# Patient Record
Sex: FEMALE | Race: WHITE | NOT HISPANIC OR LATINO | Employment: UNEMPLOYED | URBAN - METROPOLITAN AREA
[De-identification: names, ages, dates, MRNs, and addresses within clinical notes are randomized per-mention and may not be internally consistent; named-entity substitution may affect disease eponyms.]

---

## 2019-09-11 ENCOUNTER — HISTORICAL (OUTPATIENT)
Dept: ADMINISTRATIVE | Facility: HOSPITAL | Age: 59
End: 2019-09-11

## 2019-10-09 ENCOUNTER — HISTORICAL (OUTPATIENT)
Dept: ADMINISTRATIVE | Facility: HOSPITAL | Age: 59
End: 2019-10-09

## 2022-04-29 VITALS
WEIGHT: 139.56 LBS | WEIGHT: 138.88 LBS | HEIGHT: 64 IN | HEIGHT: 64 IN | BODY MASS INDEX: 23.82 KG/M2 | BODY MASS INDEX: 23.71 KG/M2

## 2022-05-02 NOTE — HISTORICAL [facility]
This is a historical note converted from Steve. Formatting and pictures may have been removed.  Please reference Steve for original formatting and attached multimedia. Chief Complaint  Right big toe fracture  History of Present Illness  This is a 59-year-old female here for follow-up of a right great toe fracture.? This injury occurred approximately 6 weeks ago.? She has been ambulating in a cam boot and reports improved pain.  Review of Systems  Denies fevers or chills  Denies numbness or paresthesias  Physical Exam  Vitals & Measurements  HT:?162?cm? WT:?63?kg? BMI:?24.01?  NAD  Resp unlabored  CV RR  Right lower extremity  Skin intact  Sensation intact to light touch  No bony tenderness over the?medial?great toe at the fracture site  No pain with range of motion of the great toe  ehl/fhl/ta/gs intact  BCR  Assessment/Plan  Toe fracture, right?S92.911A  Orders:  Clinic Follow-up PRN, 10/09/19 8:19:00 CDT  This is a 59-year-old female?who is approximately 6 weeks out from a right?great toe fracture.? At this point, she is clinically healed and may transition out of the cam boot back into?normal shoes?and advance her activity as tolerated.? Follow-up when necessary.   Medications   mg-65 mg oral powder for reconstitution, 1 packet(s), Oral, q6hr, PRN  naproxen 500 mg oral delayed release tablet, 500 mg= 1 tab(s), Oral, BID  Zanaflex 4 mg oral tablet, 4 mg= 1 tab(s), Oral, q8hr,? ?Not taking  Diagnostic Results  X-ray 3 views of the right foot show a healing fracture at the base of the?right great toe.? No interval?displacement compared to prior x-rays.? There is callous formation at the fracture site.      Sasha Heath medical record was reviewed with?Dr. Calarbese.? HPI, PE and treatment plan was reviewed.? Treatment plan was reasonable and necessary.??Imaging was reviewed at the time of visit.??
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
## 2022-11-22 ENCOUNTER — OFFICE VISIT (OUTPATIENT)
Dept: FAMILY MEDICINE | Facility: CLINIC | Age: 62
End: 2022-11-22
Payer: MEDICAID

## 2022-11-22 VITALS
DIASTOLIC BLOOD PRESSURE: 84 MMHG | OXYGEN SATURATION: 99 % | WEIGHT: 120 LBS | RESPIRATION RATE: 20 BRPM | SYSTOLIC BLOOD PRESSURE: 139 MMHG | TEMPERATURE: 98 F | HEIGHT: 63 IN | BODY MASS INDEX: 21.26 KG/M2 | HEART RATE: 94 BPM

## 2022-11-22 DIAGNOSIS — L21.9 SEBORRHEIC DERMATITIS: ICD-10-CM

## 2022-11-22 DIAGNOSIS — L82.1 SEBORRHEIC KERATOSES: ICD-10-CM

## 2022-11-22 DIAGNOSIS — H93.11 RIGHT-SIDED TINNITUS: ICD-10-CM

## 2022-11-22 DIAGNOSIS — F41.8 ANXIETY WITH DEPRESSION: Primary | ICD-10-CM

## 2022-11-22 DIAGNOSIS — Z00.00 WELLNESS EXAMINATION: ICD-10-CM

## 2022-11-22 DIAGNOSIS — Z12.31 ENCOUNTER FOR SCREENING MAMMOGRAM FOR BREAST CANCER: ICD-10-CM

## 2022-11-22 DIAGNOSIS — F31.70 BIPOLAR AFFECTIVE DISORDER IN REMISSION: ICD-10-CM

## 2022-11-22 DIAGNOSIS — F51.01 PRIMARY INSOMNIA: ICD-10-CM

## 2022-11-22 DIAGNOSIS — Z23 ENCOUNTER FOR IMMUNIZATION: ICD-10-CM

## 2022-11-22 DIAGNOSIS — Z86.010 HISTORY OF COLON POLYPS: ICD-10-CM

## 2022-11-22 LAB
ALBUMIN SERPL-MCNC: 4.3 GM/DL (ref 3.4–4.8)
ALBUMIN/GLOB SERPL: 1.3 RATIO (ref 1.1–2)
ALP SERPL-CCNC: 81 UNIT/L (ref 40–150)
ALT SERPL-CCNC: 11 UNIT/L (ref 0–55)
APPEARANCE UR: CLEAR
AST SERPL-CCNC: 18 UNIT/L (ref 5–34)
BACTERIA #/AREA URNS AUTO: ABNORMAL /HPF
BASOPHILS # BLD AUTO: 0.07 X10(3)/MCL (ref 0–0.2)
BASOPHILS NFR BLD AUTO: 0.6 %
BILIRUB UR QL STRIP.AUTO: NEGATIVE MG/DL
BILIRUBIN DIRECT+TOT PNL SERPL-MCNC: 0.4 MG/DL
BUN SERPL-MCNC: 8 MG/DL (ref 9.8–20.1)
CALCIUM SERPL-MCNC: 9.5 MG/DL (ref 8.4–10.2)
CHLORIDE SERPL-SCNC: 102 MMOL/L (ref 98–107)
CHOLEST SERPL-MCNC: 223 MG/DL
CHOLEST/HDLC SERPL: 4 {RATIO} (ref 0–5)
CO2 SERPL-SCNC: 29 MMOL/L (ref 23–31)
COLOR UR AUTO: COLORLESS
CREAT SERPL-MCNC: 0.69 MG/DL (ref 0.55–1.02)
EOSINOPHIL # BLD AUTO: 0.01 X10(3)/MCL (ref 0–0.9)
EOSINOPHIL NFR BLD AUTO: 0.1 %
ERYTHROCYTE [DISTWIDTH] IN BLOOD BY AUTOMATED COUNT: 12.8 % (ref 11.5–17)
EST. AVERAGE GLUCOSE BLD GHB EST-MCNC: 111.2 MG/DL
GFR SERPLBLD CREATININE-BSD FMLA CKD-EPI: >60 MLS/MIN/1.73/M2
GLOBULIN SER-MCNC: 3.4 GM/DL (ref 2.4–3.5)
GLUCOSE SERPL-MCNC: 137 MG/DL (ref 82–115)
GLUCOSE UR QL STRIP.AUTO: NORMAL MG/DL
HAV IGM SERPL QL IA: NONREACTIVE
HBA1C MFR BLD: 5.5 %
HBV CORE IGM SERPL QL IA: NONREACTIVE
HBV SURFACE AG SERPL QL IA: NONREACTIVE
HCT VFR BLD AUTO: 42.5 % (ref 37–47)
HCV AB SERPL QL IA: NONREACTIVE
HDLC SERPL-MCNC: 53 MG/DL (ref 35–60)
HGB BLD-MCNC: 14.3 GM/DL (ref 12–16)
HIV 1+2 AB+HIV1 P24 AG SERPL QL IA: NONREACTIVE
HYALINE CASTS #/AREA URNS LPF: ABNORMAL /LPF
IMM GRANULOCYTES # BLD AUTO: 0.04 X10(3)/MCL (ref 0–0.04)
IMM GRANULOCYTES NFR BLD AUTO: 0.3 %
KETONES UR QL STRIP.AUTO: NEGATIVE MG/DL
LDLC SERPL CALC-MCNC: 156 MG/DL (ref 50–140)
LEUKOCYTE ESTERASE UR QL STRIP.AUTO: NEGATIVE UNIT/L
LYMPHOCYTES # BLD AUTO: 1.32 X10(3)/MCL (ref 0.6–4.6)
LYMPHOCYTES NFR BLD AUTO: 10.9 %
MCH RBC QN AUTO: 31.6 PG (ref 27–31)
MCHC RBC AUTO-ENTMCNC: 33.6 MG/DL (ref 33–36)
MCV RBC AUTO: 94 FL (ref 80–94)
MONOCYTES # BLD AUTO: 0.64 X10(3)/MCL (ref 0.1–1.3)
MONOCYTES NFR BLD AUTO: 5.3 %
NEUTROPHILS # BLD AUTO: 10.1 X10(3)/MCL (ref 2.1–9.2)
NEUTROPHILS NFR BLD AUTO: 82.8 %
NITRITE UR QL STRIP.AUTO: NEGATIVE
NRBC BLD AUTO-RTO: 0 %
PH UR STRIP.AUTO: 6 [PH]
PLATELET # BLD AUTO: 316 X10(3)/MCL (ref 130–400)
PMV BLD AUTO: 9.8 FL (ref 7.4–10.4)
POTASSIUM SERPL-SCNC: 4.1 MMOL/L (ref 3.5–5.1)
PROT SERPL-MCNC: 7.7 GM/DL (ref 5.8–7.6)
PROT UR QL STRIP.AUTO: NEGATIVE MG/DL
RBC # BLD AUTO: 4.52 X10(6)/MCL (ref 4.2–5.4)
RBC #/AREA URNS AUTO: ABNORMAL /HPF
RBC UR QL AUTO: NEGATIVE UNIT/L
SODIUM SERPL-SCNC: 140 MMOL/L (ref 136–145)
SP GR UR STRIP.AUTO: 1
SQUAMOUS #/AREA URNS LPF: ABNORMAL /HPF
T4 FREE SERPL-MCNC: 0.99 NG/DL (ref 0.7–1.48)
TRIGL SERPL-MCNC: 68 MG/DL (ref 37–140)
TSH SERPL-ACNC: 0.88 UIU/ML (ref 0.35–4.94)
UROBILINOGEN UR STRIP-ACNC: NORMAL MG/DL
VLDLC SERPL CALC-MCNC: 14 MG/DL
WBC # SPEC AUTO: 12.1 X10(3)/MCL (ref 4.5–11.5)
WBC #/AREA URNS AUTO: ABNORMAL /HPF

## 2022-11-22 PROCEDURE — 84443 ASSAY THYROID STIM HORMONE: CPT | Performed by: STUDENT IN AN ORGANIZED HEALTH CARE EDUCATION/TRAINING PROGRAM

## 2022-11-22 PROCEDURE — 81001 URINALYSIS AUTO W/SCOPE: CPT | Performed by: STUDENT IN AN ORGANIZED HEALTH CARE EDUCATION/TRAINING PROGRAM

## 2022-11-22 PROCEDURE — 3008F BODY MASS INDEX DOCD: CPT | Mod: CPTII,,, | Performed by: STUDENT IN AN ORGANIZED HEALTH CARE EDUCATION/TRAINING PROGRAM

## 2022-11-22 PROCEDURE — 80053 COMPREHEN METABOLIC PANEL: CPT | Performed by: STUDENT IN AN ORGANIZED HEALTH CARE EDUCATION/TRAINING PROGRAM

## 2022-11-22 PROCEDURE — 99214 PR OFFICE/OUTPT VISIT, EST, LEVL IV, 30-39 MIN: ICD-10-PCS | Mod: 25,S$PBB,, | Performed by: STUDENT IN AN ORGANIZED HEALTH CARE EDUCATION/TRAINING PROGRAM

## 2022-11-22 PROCEDURE — 84439 ASSAY OF FREE THYROXINE: CPT | Performed by: STUDENT IN AN ORGANIZED HEALTH CARE EDUCATION/TRAINING PROGRAM

## 2022-11-22 PROCEDURE — 1159F PR MEDICATION LIST DOCUMENTED IN MEDICAL RECORD: ICD-10-PCS | Mod: CPTII,,, | Performed by: STUDENT IN AN ORGANIZED HEALTH CARE EDUCATION/TRAINING PROGRAM

## 2022-11-22 PROCEDURE — 3008F PR BODY MASS INDEX (BMI) DOCUMENTED: ICD-10-PCS | Mod: CPTII,,, | Performed by: STUDENT IN AN ORGANIZED HEALTH CARE EDUCATION/TRAINING PROGRAM

## 2022-11-22 PROCEDURE — 80074 ACUTE HEPATITIS PANEL: CPT | Performed by: STUDENT IN AN ORGANIZED HEALTH CARE EDUCATION/TRAINING PROGRAM

## 2022-11-22 PROCEDURE — 80061 LIPID PANEL: CPT | Performed by: STUDENT IN AN ORGANIZED HEALTH CARE EDUCATION/TRAINING PROGRAM

## 2022-11-22 PROCEDURE — 99205 OFFICE O/P NEW HI 60 MIN: CPT | Mod: PBBFAC,PN | Performed by: STUDENT IN AN ORGANIZED HEALTH CARE EDUCATION/TRAINING PROGRAM

## 2022-11-22 PROCEDURE — 90686 IIV4 VACC NO PRSV 0.5 ML IM: CPT | Mod: PBBFAC,PN

## 2022-11-22 PROCEDURE — 1159F MED LIST DOCD IN RCRD: CPT | Mod: CPTII,,, | Performed by: STUDENT IN AN ORGANIZED HEALTH CARE EDUCATION/TRAINING PROGRAM

## 2022-11-22 PROCEDURE — 87389 HIV-1 AG W/HIV-1&-2 AB AG IA: CPT | Performed by: STUDENT IN AN ORGANIZED HEALTH CARE EDUCATION/TRAINING PROGRAM

## 2022-11-22 PROCEDURE — 36415 COLL VENOUS BLD VENIPUNCTURE: CPT | Performed by: STUDENT IN AN ORGANIZED HEALTH CARE EDUCATION/TRAINING PROGRAM

## 2022-11-22 PROCEDURE — 3075F SYST BP GE 130 - 139MM HG: CPT | Mod: CPTII,,, | Performed by: STUDENT IN AN ORGANIZED HEALTH CARE EDUCATION/TRAINING PROGRAM

## 2022-11-22 PROCEDURE — 3079F PR MOST RECENT DIASTOLIC BLOOD PRESSURE 80-89 MM HG: ICD-10-PCS | Mod: CPTII,,, | Performed by: STUDENT IN AN ORGANIZED HEALTH CARE EDUCATION/TRAINING PROGRAM

## 2022-11-22 PROCEDURE — 3075F PR MOST RECENT SYSTOLIC BLOOD PRESS GE 130-139MM HG: ICD-10-PCS | Mod: CPTII,,, | Performed by: STUDENT IN AN ORGANIZED HEALTH CARE EDUCATION/TRAINING PROGRAM

## 2022-11-22 PROCEDURE — 83036 HEMOGLOBIN GLYCOSYLATED A1C: CPT | Performed by: STUDENT IN AN ORGANIZED HEALTH CARE EDUCATION/TRAINING PROGRAM

## 2022-11-22 PROCEDURE — 99214 OFFICE O/P EST MOD 30 MIN: CPT | Mod: 25,S$PBB,, | Performed by: STUDENT IN AN ORGANIZED HEALTH CARE EDUCATION/TRAINING PROGRAM

## 2022-11-22 PROCEDURE — 99386 PR PREVENTIVE VISIT,NEW,40-64: ICD-10-PCS | Mod: S$PBB,,, | Performed by: STUDENT IN AN ORGANIZED HEALTH CARE EDUCATION/TRAINING PROGRAM

## 2022-11-22 PROCEDURE — 85025 COMPLETE CBC W/AUTO DIFF WBC: CPT | Performed by: STUDENT IN AN ORGANIZED HEALTH CARE EDUCATION/TRAINING PROGRAM

## 2022-11-22 PROCEDURE — 99386 PREV VISIT NEW AGE 40-64: CPT | Mod: S$PBB,,, | Performed by: STUDENT IN AN ORGANIZED HEALTH CARE EDUCATION/TRAINING PROGRAM

## 2022-11-22 PROCEDURE — 3079F DIAST BP 80-89 MM HG: CPT | Mod: CPTII,,, | Performed by: STUDENT IN AN ORGANIZED HEALTH CARE EDUCATION/TRAINING PROGRAM

## 2022-11-22 RX ORDER — KETOCONAZOLE 20 MG/ML
SHAMPOO, SUSPENSION TOPICAL
Qty: 120 ML | Refills: 3 | Status: SHIPPED | OUTPATIENT
Start: 2022-11-24 | End: 2022-12-15 | Stop reason: SDUPTHER

## 2022-11-22 RX ORDER — QUETIAPINE FUMARATE 25 MG/1
25 TABLET, FILM COATED ORAL NIGHTLY
Qty: 30 TABLET | Refills: 1 | Status: SHIPPED | OUTPATIENT
Start: 2022-11-22 | End: 2022-12-15 | Stop reason: SDUPTHER

## 2022-11-22 NOTE — PROGRESS NOTES
Patient Name: Sasha Carrillo   : 1960  MRN: [id number]     Subjective:   Patient ID: Sasha Carrillo is a 62 y.o. female.    Chief Complaint:   Chief Complaint   Patient presents with    Establish Care        HPI: HPI  62-year-old female presents to clinic to establish care  States she has not had a doctor in many  Years    Bipolar disorder  States she was diagnosed approximately 12 years ago  Does describe some manic type behavior in the beginning when she was diagnosed has been tried on Trileptal and Abilify; states that this did not help her and made her feel terrible  Has not been on medication for 12 years  Denies excess shopping, gambling but does complain of difficulty sleeping    Insomnia  Reports history of insomnia for which she has tried melatonin and Saint Russ's Wort    No SI or HI reports that neither medication has helped    Right-sided tinnitus with balance issues  Reports history of right-sided tinnitus with pulsatile heartbeat  States that she will often feel off balance especially when she is getting up too quickly  Denies syncope or sensation of the room spinning      History of colon polyps  Reports having a history of been told that she has colon polyps and bright red bleeding per rectum  From hemorrhoids does not remember the year that she had colonoscopy completed      Elevated blood pressure  139/84  Initially reported that she had headaches but then denies history of headaches      Scaly skin  over face and scalp  States she picks the skin away but notices that the redness and dry skin come back     Patient reports being a former cigarette smoker  Surgical history-BTL  Family history-mother passed away from adenocarcinoma, father passed away from COPD      Health maintenance  Shingles vaccine-2022 will need repeat in  2-6 months  Influenza vaccine-2022  Pap smear-declines referral to Gynecology semi: Will replete Pap smear at next visit  ROS:  Review of Systems  "  Constitutional:  Negative for chills and fever.   HENT:  Negative for sore throat.    Respiratory:  Negative for shortness of breath and wheezing.    Cardiovascular:  Negative for chest pain, palpitations and leg swelling.   Gastrointestinal:  Negative for constipation, diarrhea and heartburn.   Genitourinary:  Negative for frequency and urgency.   Musculoskeletal:  Negative for back pain and myalgias.   Skin:  Positive for rash. Negative for itching.   Neurological:  Negative for dizziness, focal weakness and headaches.   Psychiatric/Behavioral:  The patient is not nervous/anxious.     History:     Past Medical History:   Diagnosis Date    Bipolar disorder       History reviewed. No pertinent surgical history.  Family History   Problem Relation Age of Onset    Cancer Mother     COPD Father       Social History     Tobacco Use    Smoking status: Former     Types: Cigarettes     Passive exposure: Never    Smokeless tobacco: Never    Tobacco comments:     Quit 6 month ago   Substance and Sexual Activity    Alcohol use: Not Currently     Alcohol/week: 6.0 standard drinks     Types: 6 Cans of beer per week    Drug use: Never    Sexual activity: Not Currently     Birth control/protection: Abstinence        Allergies: Review of patient's allergies indicates:  No Known Allergies  Objective:     Vitals:    11/22/22 1322   BP: 139/84   Pulse: 94   Resp: 20   Temp: 98 °F (36.7 °C)   SpO2: 99%   Weight: 54.4 kg (120 lb)   Height: 5' 3" (1.6 m)   PainSc: 0-No pain     Body mass index is 21.26 kg/m².     Physical Examination:   Physical Exam  Constitutional:       General: She is not in acute distress.  Eyes:      General: No scleral icterus.     Extraocular Movements: Extraocular movements intact.      Conjunctiva/sclera: Conjunctivae normal.      Pupils: Pupils are equal, round, and reactive to light.   Cardiovascular:      Rate and Rhythm: Normal rate and regular rhythm.      Heart sounds: No murmur heard.  Pulmonary:      " Effort: Pulmonary effort is normal. No respiratory distress.      Breath sounds: No stridor. No wheezing or rhonchi.   Abdominal:      General: Bowel sounds are normal. There is no distension.      Palpations: Abdomen is soft.      Tenderness: There is no abdominal tenderness. There is no guarding.   Musculoskeletal:         General: No swelling. Normal range of motion.   Skin:     General: Skin is warm and dry.      Coloration: Skin is not jaundiced.      Findings: Rash present.      Comments: Erythematous and scaly rash noted over nasolabial folds on forehead and hairline consistent with seborrheic dermatitis    Right thigh with 1 cm raised hyperpigmented lesion consistent with seborrheic keratoses   Neurological:      Mental Status: She is alert.      Gait: Gait normal.   Psychiatric:         Thought Content: Thought content normal.       Assessment:     1. Anxiety with depression    2. Wellness examination    3. Primary insomnia    4. Encounter for screening mammogram for breast cancer    5. Encounter for immunization    6. History of colon polyps        Plan:     Problem List Items Addressed This Visit          Psychiatric    Bipolar affective disorder in remission    Overview     Sending to Behavioral Health for re diagnoses         Relevant Orders    Ambulatory referral/consult to Behavioral Health       Derm    Seborrheic keratoses    Overview     Declines removal at this time          Seborrheic dermatitis    Overview     Ketoconazole 2%            Other    Primary insomnia    Overview     Seroquel 25 mg q.h.s. as do not want to precipitate manic episode with patient         Relevant Medications    QUEtiapine (SEROQUEL) 25 MG Tab    Other Relevant Orders    T4, Free    TSH     Other Visit Diagnoses       Anxiety with depression    -  Primary    Relevant Medications    QUEtiapine (SEROQUEL) 25 MG Tab    Other Relevant Orders    T4, Free    TSH    Wellness examination        Relevant Orders    CBC Auto  Differential    Comprehensive Metabolic Panel    Lipid Panel    T4, Free    TSH    Urinalysis    Hemoglobin A1C    Hepatitis Panel, Acute    HIV 1/2 Ag/Ab (4th Gen)    Encounter for screening mammogram for breast cancer        Relevant Orders    Mammo Digital Screening Bilat    Encounter for immunization        Relevant Medications    varicella-zoster gE-AS01B (PF)(SHINGRIX) 50 mcg/0.5 mL injection (Completed)    Other Relevant Orders    Influenza - Quadrivalent *Preferred* (6 months+) (PF) (Completed)    History of colon polyps        Relevant Orders    Ambulatory referral/consult to Gastroenterology    Right-sided tinnitus        Relevant Orders    Ambulatory referral/consult to Audiology           Problem List Items Addressed This Visit    None  Visit Diagnoses       Anxiety with depression    -  Primary    Relevant Medications    QUEtiapine (SEROQUEL) 25 MG Tab    Other Relevant Orders    T4, Free    TSH    Wellness examination        Relevant Orders    CBC Auto Differential    Comprehensive Metabolic Panel    Lipid Panel    T4, Free    TSH    Urinalysis    Hemoglobin A1C    Hepatitis Panel, Acute    HIV 1/2 Ag/Ab (4th Gen)    Primary insomnia        Relevant Medications    QUEtiapine (SEROQUEL) 25 MG Tab    Other Relevant Orders    T4, Free    TSH    Encounter for screening mammogram for breast cancer        Relevant Orders    Mammo Digital Screening Bilat    Encounter for immunization        Relevant Medications    varicella-zoster gE-AS01B (PF)(SHINGRIX) 50 mcg/0.5 mL injection (Completed)    Other Relevant Orders    Influenza - Quadrivalent *Preferred* (6 months+) (PF) (Completed)    History of colon polyps        Relevant Orders    Ambulatory referral/consult to Gastroenterology             Follow up in about 2 weeks (around 12/6/2022) for BP check, lab results, pap smear.

## 2022-11-23 ENCOUNTER — TELEPHONE (OUTPATIENT)
Dept: AUDIOLOGY | Facility: HOSPITAL | Age: 62
End: 2022-11-23
Payer: MEDICAID

## 2022-11-23 NOTE — TELEPHONE ENCOUNTER
Attempted to schedule hearing evaluation, but patient has deferred appointment at this time. Patient would like to discuss blood pressure concerns with PCP before proceeding with hearing test. Patient will call to schedule hearing evaluation if she decides she would like this done.

## 2022-11-25 LAB — PATH REV: NORMAL

## 2022-12-13 ENCOUNTER — HOSPITAL ENCOUNTER (OUTPATIENT)
Dept: RADIOLOGY | Facility: HOSPITAL | Age: 62
Discharge: HOME | End: 2022-12-13
Attending: STUDENT IN AN ORGANIZED HEALTH CARE EDUCATION/TRAINING PROGRAM
Payer: MEDICAID

## 2022-12-13 DIAGNOSIS — Z12.31 ENCOUNTER FOR SCREENING MAMMOGRAM FOR BREAST CANCER: ICD-10-CM

## 2022-12-13 PROCEDURE — 77063 BREAST TOMOSYNTHESIS BI: CPT | Mod: TC

## 2022-12-13 PROCEDURE — 77067 MAMMO DIGITAL SCREENING BILAT WITH TOMO: ICD-10-PCS | Mod: 26,,, | Performed by: RADIOLOGY

## 2022-12-13 PROCEDURE — 77067 SCR MAMMO BI INCL CAD: CPT | Mod: TC

## 2022-12-13 PROCEDURE — 77067 SCR MAMMO BI INCL CAD: CPT | Mod: 26,,, | Performed by: RADIOLOGY

## 2022-12-13 PROCEDURE — 77063 BREAST TOMOSYNTHESIS BI: CPT | Mod: 26,,, | Performed by: RADIOLOGY

## 2022-12-13 PROCEDURE — 77063 MAMMO DIGITAL SCREENING BILAT WITH TOMO: ICD-10-PCS | Mod: 26,,, | Performed by: RADIOLOGY

## 2022-12-15 ENCOUNTER — OFFICE VISIT (OUTPATIENT)
Dept: FAMILY MEDICINE | Facility: CLINIC | Age: 62
End: 2022-12-15
Payer: MEDICAID

## 2022-12-15 ENCOUNTER — TELEPHONE (OUTPATIENT)
Dept: FAMILY MEDICINE | Facility: CLINIC | Age: 62
End: 2022-12-15

## 2022-12-15 VITALS
RESPIRATION RATE: 20 BRPM | WEIGHT: 120 LBS | HEIGHT: 63 IN | OXYGEN SATURATION: 99 % | TEMPERATURE: 99 F | BODY MASS INDEX: 21.26 KG/M2 | HEART RATE: 79 BPM | SYSTOLIC BLOOD PRESSURE: 124 MMHG | DIASTOLIC BLOOD PRESSURE: 81 MMHG

## 2022-12-15 DIAGNOSIS — F51.01 PRIMARY INSOMNIA: ICD-10-CM

## 2022-12-15 DIAGNOSIS — Z01.419 WELL WOMAN EXAM: ICD-10-CM

## 2022-12-15 DIAGNOSIS — E78.5 HYPERLIPIDEMIA, UNSPECIFIED HYPERLIPIDEMIA TYPE: ICD-10-CM

## 2022-12-15 DIAGNOSIS — F41.8 ANXIETY WITH DEPRESSION: ICD-10-CM

## 2022-12-15 DIAGNOSIS — L21.9 SEBORRHEIC DERMATITIS: Primary | ICD-10-CM

## 2022-12-15 DIAGNOSIS — E78.49 OTHER HYPERLIPIDEMIA: ICD-10-CM

## 2022-12-15 PROCEDURE — 99213 OFFICE O/P EST LOW 20 MIN: CPT | Mod: PBBFAC,PN | Performed by: STUDENT IN AN ORGANIZED HEALTH CARE EDUCATION/TRAINING PROGRAM

## 2022-12-15 PROCEDURE — 3008F BODY MASS INDEX DOCD: CPT | Mod: CPTII,,, | Performed by: STUDENT IN AN ORGANIZED HEALTH CARE EDUCATION/TRAINING PROGRAM

## 2022-12-15 PROCEDURE — 3008F PR BODY MASS INDEX (BMI) DOCUMENTED: ICD-10-PCS | Mod: CPTII,,, | Performed by: STUDENT IN AN ORGANIZED HEALTH CARE EDUCATION/TRAINING PROGRAM

## 2022-12-15 PROCEDURE — 3074F PR MOST RECENT SYSTOLIC BLOOD PRESSURE < 130 MM HG: ICD-10-PCS | Mod: CPTII,,, | Performed by: STUDENT IN AN ORGANIZED HEALTH CARE EDUCATION/TRAINING PROGRAM

## 2022-12-15 PROCEDURE — 99214 OFFICE O/P EST MOD 30 MIN: CPT | Mod: S$PBB,,, | Performed by: STUDENT IN AN ORGANIZED HEALTH CARE EDUCATION/TRAINING PROGRAM

## 2022-12-15 PROCEDURE — 3074F SYST BP LT 130 MM HG: CPT | Mod: CPTII,,, | Performed by: STUDENT IN AN ORGANIZED HEALTH CARE EDUCATION/TRAINING PROGRAM

## 2022-12-15 PROCEDURE — 99214 PR OFFICE/OUTPT VISIT, EST, LEVL IV, 30-39 MIN: ICD-10-PCS | Mod: S$PBB,,, | Performed by: STUDENT IN AN ORGANIZED HEALTH CARE EDUCATION/TRAINING PROGRAM

## 2022-12-15 PROCEDURE — 3079F PR MOST RECENT DIASTOLIC BLOOD PRESSURE 80-89 MM HG: ICD-10-PCS | Mod: CPTII,,, | Performed by: STUDENT IN AN ORGANIZED HEALTH CARE EDUCATION/TRAINING PROGRAM

## 2022-12-15 PROCEDURE — 3079F DIAST BP 80-89 MM HG: CPT | Mod: CPTII,,, | Performed by: STUDENT IN AN ORGANIZED HEALTH CARE EDUCATION/TRAINING PROGRAM

## 2022-12-15 RX ORDER — QUETIAPINE FUMARATE 25 MG/1
25 TABLET, FILM COATED ORAL NIGHTLY
Qty: 30 TABLET | Refills: 1 | Status: SHIPPED | OUTPATIENT
Start: 2022-12-15 | End: 2023-01-30 | Stop reason: SDUPTHER

## 2022-12-15 RX ORDER — ROSUVASTATIN CALCIUM 5 MG/1
5 TABLET, COATED ORAL DAILY
Qty: 90 TABLET | Refills: 3 | Status: SHIPPED | OUTPATIENT
Start: 2022-12-15 | End: 2023-12-06 | Stop reason: SDUPTHER

## 2022-12-15 RX ORDER — KETOCONAZOLE 20 MG/ML
SHAMPOO, SUSPENSION TOPICAL
Qty: 120 ML | Refills: 11 | Status: SHIPPED | OUTPATIENT
Start: 2022-12-15

## 2022-12-15 NOTE — PROGRESS NOTES
Patient Name: Sasha Carrillo   : 1960  MRN: [id number]     Subjective:   Patient ID: Sasha Carrillo is a 62 y.o. female.    Chief Complaint:   Chief Complaint   Patient presents with    Follow-up        HPI: HPI  62-year-old female presents to clinic for follow up   States she has not had a doctor in many  Years    Bipolar disorder  States she was diagnosed approximately 12 years ago  Does describe some manic type behavior in the beginning when she was diagnosed has been tried on Trileptal and Abilify; states that this did not help her and made her feel terrible  Has not been on medication for 12 years  Denies excess shopping, gambling but does complain of difficulty sleeping  Referred to  per patient request previous appointment. Has not yet had appointment    Insomnia  Reports history of insomnia for which she has tried melatonin and Saint Russ's Wort  Asked for seroquel as she states she has been on the medication in the past  Was provided previous appointment but did not receive    No SI or HI reports that neither medication has helped    Right-sided tinnitus with balance issues  Reports history of right-sided tinnitus with pulsatile heartbeat  States that she will often feel off balance especially when she is getting up too quickly  Denies syncope or sensation of the room spinning  Was referred to audiology but did not attend and said problem has ceased       History of colon polyps  Reports having a history of been told that she has colon polyps and bright red bleeding per rectum  From hemorrhoids does not remember the year that she had colonoscopy completed        SD  States she picks the skin away but notices that the redness and dry skin come back   Ketoconazole shampoo has relieved scaliness       HLD  Noted on labs      Patient reports being a former cigarette smoker  Surgical history-BTL  Family history-mother passed away from adenocarcinoma, father passed away from COPD      Health  "maintenance  Shingles vaccine-11/22/2022 will need repeat in  2-6 months  Influenza vaccine-11/22/2022  Pap smear-declines referral to Gynecology semi: ROS:  Review of Systems   Constitutional:  Negative for chills and fever.   HENT:  Negative for sore throat.    Respiratory:  Negative for shortness of breath and wheezing.    Cardiovascular:  Negative for chest pain, palpitations and leg swelling.   Gastrointestinal:  Negative for constipation, diarrhea and heartburn.   Genitourinary:  Negative for frequency and urgency.   Musculoskeletal:  Negative for back pain and myalgias.   Skin:  Positive for rash. Negative for itching.   Neurological:  Negative for dizziness, focal weakness and headaches.   Psychiatric/Behavioral:  The patient is not nervous/anxious.     History:     Past Medical History:   Diagnosis Date    Bipolar disorder       History reviewed. No pertinent surgical history.  Family History   Problem Relation Age of Onset    Cancer Mother     COPD Father       Social History     Tobacco Use    Smoking status: Former     Types: Cigarettes     Passive exposure: Never    Smokeless tobacco: Never    Tobacco comments:     Quit 6 month ago   Substance and Sexual Activity    Alcohol use: Not Currently     Alcohol/week: 6.0 standard drinks     Types: 6 Cans of beer per week    Drug use: Never    Sexual activity: Not Currently     Birth control/protection: Abstinence        Allergies: Review of patient's allergies indicates:  No Known Allergies  Objective:     Vitals:    12/15/22 1001   BP: 124/81   Pulse: 79   Resp: 20   Temp: 98.7 °F (37.1 °C)   SpO2: 99%   Weight: 54.4 kg (120 lb)   Height: 5' 3" (1.6 m)   PainSc: 0-No pain     Body mass index is 21.26 kg/m².     Physical Examination:   Physical Exam  Constitutional:       General: She is not in acute distress.  Eyes:      General: No scleral icterus.     Extraocular Movements: Extraocular movements intact.      Conjunctiva/sclera: Conjunctivae normal.      " Pupils: Pupils are equal, round, and reactive to light.   Cardiovascular:      Rate and Rhythm: Normal rate and regular rhythm.      Heart sounds: No murmur heard.  Pulmonary:      Effort: Pulmonary effort is normal. No respiratory distress.      Breath sounds: No stridor. No wheezing or rhonchi.   Abdominal:      General: Bowel sounds are normal. There is no distension.      Palpations: Abdomen is soft.      Tenderness: There is no abdominal tenderness. There is no guarding.   Musculoskeletal:         General: No swelling. Normal range of motion.   Skin:     General: Skin is warm and dry.      Coloration: Skin is not jaundiced.      Findings: Rash present.      Comments: Erythematous and scaly rash noted over nasolabial folds on forehead and hairline consistent with seborrheic dermatitis    Right thigh with 1 cm raised hyperpigmented lesion consistent with seborrheic keratoses   Neurological:      Mental Status: She is alert.      Gait: Gait normal.   Psychiatric:         Thought Content: Thought content normal.       Assessment:     1. Seborrheic dermatitis    2. Anxiety with depression    3. Primary insomnia    4. Hyperlipidemia, unspecified hyperlipidemia type    5. Well woman exam    6. Other hyperlipidemia        Plan:     Problem List Items Addressed This Visit          Derm    Seborrheic dermatitis - Primary    Overview     Ketoconazole 2% refills given         Relevant Medications    ketoconazole (NIZORAL) 2 % shampoo       Cardiac/Vascular    Other hyperlipidemia    Overview     Shared decision making. Would like to start Crestor  5 mg started with medication AE discussion             Other    Primary insomnia    Overview     Seroquel 25 mg q.h.s. as do not want to precipitate manic episode with patient  Referred to          Relevant Medications    QUEtiapine (SEROQUEL) 25 MG Tab     Other Visit Diagnoses       Anxiety with depression        Relevant Medications    QUEtiapine (SEROQUEL) 25 MG Tab     Hyperlipidemia, unspecified hyperlipidemia type        Relevant Medications    rosuvastatin (CRESTOR) 5 MG tablet    Well woman exam        Relevant Orders    Ambulatory referral/consult to Gynecology           Problem List Items Addressed This Visit          Derm    Seborrheic dermatitis - Primary    Overview     Ketoconazole 2% refills given         Relevant Medications    ketoconazole (NIZORAL) 2 % shampoo       Cardiac/Vascular    Other hyperlipidemia    Overview     Shared decision making. Would like to start Crestor  5 mg started with medication AE discussion             Other    Primary insomnia    Overview     Seroquel 25 mg q.h.s. as do not want to precipitate manic episode with patient  Referred to          Relevant Medications    QUEtiapine (SEROQUEL) 25 MG Tab     Other Visit Diagnoses       Anxiety with depression        Relevant Medications    QUEtiapine (SEROQUEL) 25 MG Tab    Hyperlipidemia, unspecified hyperlipidemia type        Relevant Medications    rosuvastatin (CRESTOR) 5 MG tablet    Well woman exam        Relevant Orders    Ambulatory referral/consult to Gynecology             Follow up in about 2 weeks (around 12/29/2022) for Virtual Visit, Insomnia.

## 2023-01-30 ENCOUNTER — OFFICE VISIT (OUTPATIENT)
Dept: FAMILY MEDICINE | Facility: CLINIC | Age: 63
End: 2023-01-30
Payer: MEDICAID

## 2023-01-30 DIAGNOSIS — F41.8 ANXIETY WITH DEPRESSION: ICD-10-CM

## 2023-01-30 DIAGNOSIS — F51.01 PRIMARY INSOMNIA: ICD-10-CM

## 2023-01-30 DIAGNOSIS — Z12.11 COLON CANCER SCREENING: Primary | ICD-10-CM

## 2023-01-30 PROCEDURE — 99213 PR OFFICE/OUTPT VISIT, EST, LEVL III, 20-29 MIN: ICD-10-PCS | Mod: 95,,, | Performed by: STUDENT IN AN ORGANIZED HEALTH CARE EDUCATION/TRAINING PROGRAM

## 2023-01-30 PROCEDURE — 99213 OFFICE O/P EST LOW 20 MIN: CPT | Mod: 95,,, | Performed by: STUDENT IN AN ORGANIZED HEALTH CARE EDUCATION/TRAINING PROGRAM

## 2023-01-30 RX ORDER — QUETIAPINE FUMARATE 25 MG/1
25 TABLET, FILM COATED ORAL NIGHTLY
Qty: 30 TABLET | Refills: 6 | Status: SHIPPED | OUTPATIENT
Start: 2023-01-30 | End: 2023-02-15 | Stop reason: DRUGHIGH

## 2023-01-30 NOTE — PROGRESS NOTES
Audio Only Telehealth Visit     The patient location is: home  The chief complaint leading to consultation is: lipid results   Visit type: Virtual visit with audio only (telephone)  Total time spent with patient: 15 minutes      The reason for the audio only service rather than synchronous audio and video virtual visit was related to technical difficulties or patient preference/necessity.     Each patient to whom I provide medical services by telemedicine is:  (1) informed of the relationship between the physician and patient and the respective role of any other health care provider with respect to management of the patient; and (2) notified that they may decline to receive medical services by telemedicine and may withdraw from such care at any time. Patient verbally consented to receive this service via voice-only telephone call.       HPI:     62-year-old female presents for follow up   States she has not had a doctor in many  Years     Bipolar disorder  States she was diagnosed approximately 12 years ago  Does describe some manic type behavior in the beginning when she was diagnosed has been tried on Trileptal and Abilify; states that this did not help her and made her feel terrible  Has not been on medication for 12 years  Denies excess shopping, gambling but does complain of difficulty sleeping  Referred to  per patient request and has upcoming appointment 2/15/2023     Insomnia  Reports history of insomnia for which she has tried melatonin and Saint Russ's Wort  Asked for seroquel as she states she has been on the medication in the past  States she is sleeping better    No SI or HI      Right-sided tinnitus with balance issues  Reports history of right-sided tinnitus with pulsatile heartbeat  States that she will often feel off balance especially when she is getting up too quickly  Denies syncope or sensation of the room spinning  Was referred to audiology but did not attend and said problem has ceased                 SD  States she picks the skin away but notices that the redness and dry skin come back   Ketoconazole shampoo has relieved scaliness         HLD  Noted on labs  Crestor 5 mg started with good results         Patient reports being a former cigarette smoker  Surgical history-BTL  Family history-mother passed away from adenocarcinoma, father passed away from COPD        Health maintenance  Shingles vaccine-11/22/2022 will need repeat in  2-6 months  Influenza vaccine-11/22/2022  Pap smear-declines referral to Gynecology semi:      Assessment and plan:             Follow up in about 1 month (around 2/28/2023) for Pap smear .              This service was not originating from a related E/M service provided within the previous 7 days nor will  to an E/M service or procedure within the next 24 hours or my soonest available appointment.  Prevailing standard of care was able to be met in this audio-only visit.

## 2023-02-13 ENCOUNTER — PATIENT MESSAGE (OUTPATIENT)
Dept: ADMINISTRATIVE | Facility: HOSPITAL | Age: 63
End: 2023-02-13
Payer: MEDICAID

## 2023-02-15 ENCOUNTER — OFFICE VISIT (OUTPATIENT)
Dept: BEHAVIORAL HEALTH | Facility: CLINIC | Age: 63
End: 2023-02-15
Payer: MEDICAID

## 2023-02-15 VITALS
DIASTOLIC BLOOD PRESSURE: 76 MMHG | BODY MASS INDEX: 22.18 KG/M2 | WEIGHT: 125.19 LBS | TEMPERATURE: 98 F | HEART RATE: 70 BPM | SYSTOLIC BLOOD PRESSURE: 112 MMHG

## 2023-02-15 DIAGNOSIS — F51.01 PRIMARY INSOMNIA: ICD-10-CM

## 2023-02-15 DIAGNOSIS — F41.9 ANXIETY: ICD-10-CM

## 2023-02-15 DIAGNOSIS — F31.9 BIPOLAR DISORDER WITH DEPRESSION: Primary | ICD-10-CM

## 2023-02-15 LAB
AMPHET UR QL SCN: NEGATIVE
BARBITURATE SCN PRESENT UR: NEGATIVE
BENZODIAZ UR QL SCN: NEGATIVE
CANNABINOIDS UR QL SCN: NEGATIVE
COCAINE UR QL SCN: NEGATIVE
FENTANYL UR QL SCN: NEGATIVE
MDMA UR QL SCN: NEGATIVE
OPIATES UR QL SCN: NEGATIVE
PCP UR QL: NEGATIVE
PH UR: 5.5 [PH] (ref 3–11)

## 2023-02-15 PROCEDURE — 99204 PR OFFICE/OUTPT VISIT, NEW, LEVL IV, 45-59 MIN: ICD-10-PCS | Mod: S$PBB,,, | Performed by: STUDENT IN AN ORGANIZED HEALTH CARE EDUCATION/TRAINING PROGRAM

## 2023-02-15 PROCEDURE — 99204 OFFICE O/P NEW MOD 45 MIN: CPT | Mod: S$PBB,,, | Performed by: STUDENT IN AN ORGANIZED HEALTH CARE EDUCATION/TRAINING PROGRAM

## 2023-02-15 PROCEDURE — 80307 DRUG TEST PRSMV CHEM ANLYZR: CPT | Performed by: STUDENT IN AN ORGANIZED HEALTH CARE EDUCATION/TRAINING PROGRAM

## 2023-02-15 PROCEDURE — 1160F PR REVIEW ALL MEDS BY PRESCRIBER/CLIN PHARMACIST DOCUMENTED: ICD-10-PCS | Mod: CPTII,,, | Performed by: STUDENT IN AN ORGANIZED HEALTH CARE EDUCATION/TRAINING PROGRAM

## 2023-02-15 PROCEDURE — 1159F MED LIST DOCD IN RCRD: CPT | Mod: CPTII,,, | Performed by: STUDENT IN AN ORGANIZED HEALTH CARE EDUCATION/TRAINING PROGRAM

## 2023-02-15 PROCEDURE — 3074F PR MOST RECENT SYSTOLIC BLOOD PRESSURE < 130 MM HG: ICD-10-PCS | Mod: CPTII,,, | Performed by: STUDENT IN AN ORGANIZED HEALTH CARE EDUCATION/TRAINING PROGRAM

## 2023-02-15 PROCEDURE — 3074F SYST BP LT 130 MM HG: CPT | Mod: CPTII,,, | Performed by: STUDENT IN AN ORGANIZED HEALTH CARE EDUCATION/TRAINING PROGRAM

## 2023-02-15 PROCEDURE — 1160F RVW MEDS BY RX/DR IN RCRD: CPT | Mod: CPTII,,, | Performed by: STUDENT IN AN ORGANIZED HEALTH CARE EDUCATION/TRAINING PROGRAM

## 2023-02-15 PROCEDURE — 3078F DIAST BP <80 MM HG: CPT | Mod: CPTII,,, | Performed by: STUDENT IN AN ORGANIZED HEALTH CARE EDUCATION/TRAINING PROGRAM

## 2023-02-15 PROCEDURE — 1159F PR MEDICATION LIST DOCUMENTED IN MEDICAL RECORD: ICD-10-PCS | Mod: CPTII,,, | Performed by: STUDENT IN AN ORGANIZED HEALTH CARE EDUCATION/TRAINING PROGRAM

## 2023-02-15 PROCEDURE — 3008F BODY MASS INDEX DOCD: CPT | Mod: CPTII,,, | Performed by: STUDENT IN AN ORGANIZED HEALTH CARE EDUCATION/TRAINING PROGRAM

## 2023-02-15 PROCEDURE — 3078F PR MOST RECENT DIASTOLIC BLOOD PRESSURE < 80 MM HG: ICD-10-PCS | Mod: CPTII,,, | Performed by: STUDENT IN AN ORGANIZED HEALTH CARE EDUCATION/TRAINING PROGRAM

## 2023-02-15 PROCEDURE — 99213 OFFICE O/P EST LOW 20 MIN: CPT | Mod: PBBFAC,PN | Performed by: STUDENT IN AN ORGANIZED HEALTH CARE EDUCATION/TRAINING PROGRAM

## 2023-02-15 PROCEDURE — 3008F PR BODY MASS INDEX (BMI) DOCUMENTED: ICD-10-PCS | Mod: CPTII,,, | Performed by: STUDENT IN AN ORGANIZED HEALTH CARE EDUCATION/TRAINING PROGRAM

## 2023-02-15 RX ORDER — QUETIAPINE FUMARATE 50 MG/1
100 TABLET, EXTENDED RELEASE ORAL DAILY
Qty: 60 TABLET | Refills: 2 | Status: SHIPPED | OUTPATIENT
Start: 2023-02-15 | End: 2023-03-29 | Stop reason: SDUPTHER
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
## 2023-03-15 LAB — NONINV COLON CA DNA+OCC BLD SCRN STL QL: POSITIVE

## 2023-03-29 ENCOUNTER — OFFICE VISIT (OUTPATIENT)
Dept: BEHAVIORAL HEALTH | Facility: CLINIC | Age: 63
End: 2023-03-29
Payer: MEDICAID

## 2023-03-29 VITALS
SYSTOLIC BLOOD PRESSURE: 118 MMHG | WEIGHT: 129.69 LBS | BODY MASS INDEX: 22.98 KG/M2 | HEART RATE: 72 BPM | OXYGEN SATURATION: 100 % | DIASTOLIC BLOOD PRESSURE: 78 MMHG | TEMPERATURE: 99 F

## 2023-03-29 DIAGNOSIS — F51.01 PRIMARY INSOMNIA: ICD-10-CM

## 2023-03-29 DIAGNOSIS — F41.9 ANXIETY: ICD-10-CM

## 2023-03-29 DIAGNOSIS — F31.9 BIPOLAR DISORDER WITH DEPRESSION: Primary | ICD-10-CM

## 2023-03-29 PROCEDURE — 3074F PR MOST RECENT SYSTOLIC BLOOD PRESSURE < 130 MM HG: ICD-10-PCS | Mod: CPTII,,, | Performed by: STUDENT IN AN ORGANIZED HEALTH CARE EDUCATION/TRAINING PROGRAM

## 2023-03-29 PROCEDURE — 3078F DIAST BP <80 MM HG: CPT | Mod: CPTII,,, | Performed by: STUDENT IN AN ORGANIZED HEALTH CARE EDUCATION/TRAINING PROGRAM

## 2023-03-29 PROCEDURE — 1160F PR REVIEW ALL MEDS BY PRESCRIBER/CLIN PHARMACIST DOCUMENTED: ICD-10-PCS | Mod: CPTII,,, | Performed by: STUDENT IN AN ORGANIZED HEALTH CARE EDUCATION/TRAINING PROGRAM

## 2023-03-29 PROCEDURE — 3008F BODY MASS INDEX DOCD: CPT | Mod: CPTII,,, | Performed by: STUDENT IN AN ORGANIZED HEALTH CARE EDUCATION/TRAINING PROGRAM

## 2023-03-29 PROCEDURE — 3008F PR BODY MASS INDEX (BMI) DOCUMENTED: ICD-10-PCS | Mod: CPTII,,, | Performed by: STUDENT IN AN ORGANIZED HEALTH CARE EDUCATION/TRAINING PROGRAM

## 2023-03-29 PROCEDURE — 99213 OFFICE O/P EST LOW 20 MIN: CPT | Mod: S$PBB,,, | Performed by: STUDENT IN AN ORGANIZED HEALTH CARE EDUCATION/TRAINING PROGRAM

## 2023-03-29 PROCEDURE — 99213 PR OFFICE/OUTPT VISIT, EST, LEVL III, 20-29 MIN: ICD-10-PCS | Mod: S$PBB,,, | Performed by: STUDENT IN AN ORGANIZED HEALTH CARE EDUCATION/TRAINING PROGRAM

## 2023-03-29 PROCEDURE — 3078F PR MOST RECENT DIASTOLIC BLOOD PRESSURE < 80 MM HG: ICD-10-PCS | Mod: CPTII,,, | Performed by: STUDENT IN AN ORGANIZED HEALTH CARE EDUCATION/TRAINING PROGRAM

## 2023-03-29 PROCEDURE — 1159F MED LIST DOCD IN RCRD: CPT | Mod: CPTII,,, | Performed by: STUDENT IN AN ORGANIZED HEALTH CARE EDUCATION/TRAINING PROGRAM

## 2023-03-29 PROCEDURE — 1160F RVW MEDS BY RX/DR IN RCRD: CPT | Mod: CPTII,,, | Performed by: STUDENT IN AN ORGANIZED HEALTH CARE EDUCATION/TRAINING PROGRAM

## 2023-03-29 PROCEDURE — 3074F SYST BP LT 130 MM HG: CPT | Mod: CPTII,,, | Performed by: STUDENT IN AN ORGANIZED HEALTH CARE EDUCATION/TRAINING PROGRAM

## 2023-03-29 PROCEDURE — 1159F PR MEDICATION LIST DOCUMENTED IN MEDICAL RECORD: ICD-10-PCS | Mod: CPTII,,, | Performed by: STUDENT IN AN ORGANIZED HEALTH CARE EDUCATION/TRAINING PROGRAM

## 2023-03-29 PROCEDURE — 99213 OFFICE O/P EST LOW 20 MIN: CPT | Mod: PBBFAC,PN | Performed by: STUDENT IN AN ORGANIZED HEALTH CARE EDUCATION/TRAINING PROGRAM

## 2023-03-29 RX ORDER — BUPROPION HYDROCHLORIDE 150 MG/1
150 TABLET ORAL DAILY
Qty: 30 TABLET | Refills: 5 | Status: SHIPPED | OUTPATIENT
Start: 2023-03-29 | End: 2023-05-16 | Stop reason: SDUPTHER

## 2023-03-29 RX ORDER — QUETIAPINE FUMARATE 50 MG/1
100 TABLET, EXTENDED RELEASE ORAL DAILY
Qty: 60 TABLET | Refills: 5 | Status: SHIPPED | OUTPATIENT
Start: 2023-03-29 | End: 2023-05-16 | Stop reason: SDUPTHER

## 2023-03-29 NOTE — PROGRESS NOTES
"Outpatient Psychiatry Follow-Up Visit    3/29/2023    Clinical Status of Patient:  Outpatient (Ambulatory)    Chief Complaint:  Sasha Carrillo is a 62 y.o. female who presents today for follow-up of mood disorder. Patient last seen for initial evaluation on 2/15/2023. Met with patient.      Interval History and Content of Current Session:  Interim Events/Subjective Report/Content of Current Session:   Pt reports doing "ok" since last visit.  Notes mood has been depressed, no change from last visit.  Anxiety largely unchanged.  Drinking less coffee and has been less jittery.  Sleeping "better," 8 hrs nightly.  Rested on awakening.  Energy low, low motivation, concentration "sporadic."  Denies irritability, denies hopelessness.  Denies SI/HI/AVH/paranoia, denies plan or desire for self harm or harm to others.  Notes SE of daytime tiredness from seroquel (taking at 10-11pm).  Denies somatic symptoms.      Psychiatric Review of Systems-is patient experiencing or having changes in  Anxiety: unchanged  Sleep: improved  Appetite: good  Weight: unchanged  Energy: low  Concentration: fair   Libido: no problem voiced  Irritability: denies  Motivation: low  Guilt/hopelessness: denies  Paranoia/delusions: denies  SIB/risky behavior: denies    Review of Systems   PSYCHIATRIC: Pertinant items are noted in the narrative.  CONSTITUTIONAL: No weight gain or loss.  MUSCULOSKELETAL: No pain or stiffness of the joints.  NEUROLOGIC: No weakness, sensory changes, seizures, confusion, memory loss, tremor or other abnormal movements.  CARDIAC: No CP, no palpitations  RESPIRATORY: No shortness of breath.  CARDIOVASCULAR: No tachycardia or chest pain.  GASTROINTESTINAL: No nausea, vomiting, pain, constipation or diarrhea.    Past Medical, Family and Social History: The patient's past medical, family and social history have been reviewed and updated as appropriate within the electronic medical record - see encounter notes.    Compliance: " "good    Side effects: tiredness from seroquel    Risk Parameters:  Patient reports no suicidal ideation  Patient reports no homicidal ideation  Patient reports no self-injurious behavior  Patient reports no violent behavior    Exam (detailed: at least 9 elements; comprehensive: all 15 elements)   Constitutional  Vitals:  Most recent vital signs, dated less than 90 days prior to this appointment, were reviewed.     Vitals:    03/29/23 1417   BP: 118/78   Pulse: 72   Temp: 98.7 °F (37.1 °C)   SpO2: 100%   Weight: 58.8 kg (129 lb 11.2 oz)        General:   Constitutional: No acute distress, appears stated age, casually dressed    Neurologic:   Motor: moves all extremities spontaneously and without difficulty, no abnormal involuntary movements observed  Gait: normal gait and station    Mental status examination:   Appearance: appears stated age, casually dressed, no acute distress  Behavior: unremarkable for situation, calm and cooperative  Mood: "tired"  Affect: mood congruent and constricted  Thought process: linear and goal directed  Associations: appropriate for conversation  Thought content: no plan or desire for self harm or harm to others, denies paranoia, no delusional ideation volunteered  Perceptions: denies hallucinations or other altered perceptions  Orientation: oriented to day of week, month, year, location, and situation  Language: English, fluid  Attention: able to attend to interview  Insight: good  Judgement: good    PHQ9 3/29/2023   Total Score 17     GAD7 3/29/2023 2/15/2023   1. Feeling nervous, anxious, or on edge? 3 2   2. Not being able to stop or control worrying? 2 3   3. Worrying too much about different things? 2 3   4. Trouble relaxing? 0 2   5. Being so restless that it is hard to sit still? 2 0   6. Becoming easily annoyed or irritable? 1 1   7. Feeling afraid as if something awful might happen? 0 0   8. If you checked off any problems, how difficult have these problems made it for you to " do your work, take care of things at home, or get along with other people? 2 1   DANIELA-7 Score 10 11     Assessment and Diagnosis   Status/Progress: Based on the examination today, the patient's problem(s) is/are adequately but not ideally controlled.  New problems have not been presented today.   Co-morbidities and Lack of compliance are not complicating management of the primary condition.       General Impression:    ICD-10-CM ICD-9-CM   1. Bipolar disorder with depression  F31.9 296.50   2. Anxiety  F41.9 300.00   3. Primary insomnia  F51.01 307.42     Intervention/Counseling/Treatment Plan   Continue seroquel XR 100mg nightly  Start wellbutrin XL 150mg daily, Discussed potential SE including but not limited to anxiety, irritability, restlessness, palpitations, suicidal thinking  Recommend pt establish with a psychotherapist/counselor, pt awaiting call back for appointment at [facility]  Recent labwork in EMR reviewed, CBC w/ elevated WBC, CMP wnl, tSH wnl, hemoglobin a1c wnl, FLP w/ elevated TC and LDL  UDS negative at initial visit  AIMS 0 today  No need for PEC as pt is not an imminent danger to self or others or gravely disabled due to acute psychiatric illness  Discussed that pt should either call clinic for psychiatric crisis symptoms or present to nearest emergency room    Discussed with patient informed consent including diagnosis, risks and benefits of proposed treatment above vs. alternative treatments vs. no treatment, as well as serious and common side effects of these treatments, and the inherent unpredictability of individual responses to these treatments. The patient expresses understanding of the above and displays the capacity to agree with this current plan. Patient also agrees that, currently, the benefits outweigh the risks and would like to pursue treatment at this time, and had no other questions.    Instructions:  Take all medications as prescribed.    Abstain from recreational drugs and  alcohol.  Present to ED or call 911 for SI/HI plan or intent, psychosis, or medical emergency.    Return to Clinic: Follow up in about 8 weeks (around 5/24/2023).    Total time:   Complexity (level) of medical decision making employed in the encounter: MODERATE    The total time for services performed on the date of the encounter: 25 minutes    Micheal Fairchild MD  [facility]

## 2023-05-16 ENCOUNTER — TELEPHONE (OUTPATIENT)
Dept: BEHAVIORAL HEALTH | Facility: CLINIC | Age: 63
End: 2023-05-16
Payer: MEDICAID

## 2023-05-16 ENCOUNTER — PATIENT MESSAGE (OUTPATIENT)
Dept: ADMINISTRATIVE | Facility: HOSPITAL | Age: 63
End: 2023-05-16
Payer: MEDICAID

## 2023-05-16 DIAGNOSIS — F51.01 PRIMARY INSOMNIA: ICD-10-CM

## 2023-05-16 DIAGNOSIS — F31.9 BIPOLAR DISORDER WITH DEPRESSION: ICD-10-CM

## 2023-05-16 RX ORDER — QUETIAPINE FUMARATE 50 MG/1
100 TABLET, EXTENDED RELEASE ORAL DAILY
Qty: 60 TABLET | Refills: 5 | Status: SHIPPED | OUTPATIENT
Start: 2023-05-16 | End: 2024-05-15

## 2023-05-16 RX ORDER — BUPROPION HYDROCHLORIDE 150 MG/1
150 TABLET ORAL DAILY
Qty: 30 TABLET | Refills: 5 | Status: ON HOLD | OUTPATIENT
Start: 2023-05-16 | End: 2023-10-19 | Stop reason: HOSPADM

## 2023-06-14 DIAGNOSIS — E78.5 HYPERLIPIDEMIA, UNSPECIFIED HYPERLIPIDEMIA TYPE: ICD-10-CM

## 2023-06-30 RX ORDER — ROSUVASTATIN CALCIUM 5 MG/1
5 TABLET, COATED ORAL DAILY
Qty: 90 TABLET | Refills: 3 | OUTPATIENT
Start: 2023-06-30 | End: 2024-06-29

## 2023-07-10 ENCOUNTER — PATIENT MESSAGE (OUTPATIENT)
Dept: ADMINISTRATIVE | Facility: HOSPITAL | Age: 63
End: 2023-07-10
Payer: MEDICAID

## 2023-07-10 ENCOUNTER — TELEPHONE (OUTPATIENT)
Dept: FAMILY MEDICINE | Facility: CLINIC | Age: 63
End: 2023-07-10
Payer: MEDICAID

## 2023-08-09 ENCOUNTER — TELEPHONE (OUTPATIENT)
Dept: FAMILY MEDICINE | Facility: CLINIC | Age: 63
End: 2023-08-09
Payer: MEDICAID

## 2023-08-10 ENCOUNTER — OFFICE VISIT (OUTPATIENT)
Dept: BEHAVIORAL HEALTH | Facility: CLINIC | Age: 63
End: 2023-08-10
Payer: MEDICAID

## 2023-08-10 VITALS
OXYGEN SATURATION: 99 % | BODY MASS INDEX: 24.04 KG/M2 | TEMPERATURE: 98 F | HEART RATE: 76 BPM | SYSTOLIC BLOOD PRESSURE: 142 MMHG | DIASTOLIC BLOOD PRESSURE: 88 MMHG | WEIGHT: 135.69 LBS

## 2023-08-10 DIAGNOSIS — F41.9 ANXIETY: ICD-10-CM

## 2023-08-10 DIAGNOSIS — F31.9 BIPOLAR DISORDER WITH DEPRESSION: Primary | ICD-10-CM

## 2023-08-10 PROCEDURE — 3077F SYST BP >= 140 MM HG: CPT | Mod: CPTII,,, | Performed by: STUDENT IN AN ORGANIZED HEALTH CARE EDUCATION/TRAINING PROGRAM

## 2023-08-10 PROCEDURE — 1159F PR MEDICATION LIST DOCUMENTED IN MEDICAL RECORD: ICD-10-PCS | Mod: CPTII,,, | Performed by: STUDENT IN AN ORGANIZED HEALTH CARE EDUCATION/TRAINING PROGRAM

## 2023-08-10 PROCEDURE — 99213 OFFICE O/P EST LOW 20 MIN: CPT | Mod: PBBFAC,PN | Performed by: STUDENT IN AN ORGANIZED HEALTH CARE EDUCATION/TRAINING PROGRAM

## 2023-08-10 PROCEDURE — 3008F PR BODY MASS INDEX (BMI) DOCUMENTED: ICD-10-PCS | Mod: CPTII,,, | Performed by: STUDENT IN AN ORGANIZED HEALTH CARE EDUCATION/TRAINING PROGRAM

## 2023-08-10 PROCEDURE — 3008F BODY MASS INDEX DOCD: CPT | Mod: CPTII,,, | Performed by: STUDENT IN AN ORGANIZED HEALTH CARE EDUCATION/TRAINING PROGRAM

## 2023-08-10 PROCEDURE — 99214 OFFICE O/P EST MOD 30 MIN: CPT | Mod: S$PBB,,, | Performed by: STUDENT IN AN ORGANIZED HEALTH CARE EDUCATION/TRAINING PROGRAM

## 2023-08-10 PROCEDURE — 3077F PR MOST RECENT SYSTOLIC BLOOD PRESSURE >= 140 MM HG: ICD-10-PCS | Mod: CPTII,,, | Performed by: STUDENT IN AN ORGANIZED HEALTH CARE EDUCATION/TRAINING PROGRAM

## 2023-08-10 PROCEDURE — 1159F MED LIST DOCD IN RCRD: CPT | Mod: CPTII,,, | Performed by: STUDENT IN AN ORGANIZED HEALTH CARE EDUCATION/TRAINING PROGRAM

## 2023-08-10 PROCEDURE — 3079F DIAST BP 80-89 MM HG: CPT | Mod: CPTII,,, | Performed by: STUDENT IN AN ORGANIZED HEALTH CARE EDUCATION/TRAINING PROGRAM

## 2023-08-10 PROCEDURE — 99214 PR OFFICE/OUTPT VISIT, EST, LEVL IV, 30-39 MIN: ICD-10-PCS | Mod: S$PBB,,, | Performed by: STUDENT IN AN ORGANIZED HEALTH CARE EDUCATION/TRAINING PROGRAM

## 2023-08-10 PROCEDURE — 1160F RVW MEDS BY RX/DR IN RCRD: CPT | Mod: CPTII,,, | Performed by: STUDENT IN AN ORGANIZED HEALTH CARE EDUCATION/TRAINING PROGRAM

## 2023-08-10 PROCEDURE — 3079F PR MOST RECENT DIASTOLIC BLOOD PRESSURE 80-89 MM HG: ICD-10-PCS | Mod: CPTII,,, | Performed by: STUDENT IN AN ORGANIZED HEALTH CARE EDUCATION/TRAINING PROGRAM

## 2023-08-10 PROCEDURE — 1160F PR REVIEW ALL MEDS BY PRESCRIBER/CLIN PHARMACIST DOCUMENTED: ICD-10-PCS | Mod: CPTII,,, | Performed by: STUDENT IN AN ORGANIZED HEALTH CARE EDUCATION/TRAINING PROGRAM

## 2023-08-10 RX ORDER — DIVALPROEX SODIUM 500 MG/1
500 TABLET, FILM COATED, EXTENDED RELEASE ORAL 2 TIMES DAILY
Qty: 60 TABLET | Refills: 5 | Status: SHIPPED | OUTPATIENT
Start: 2023-08-10 | End: 2024-08-09

## 2023-08-10 NOTE — PROGRESS NOTES
"Outpatient Psychiatry Follow-Up Visit    8/10/2023    Clinical Status of Patient:  Outpatient (Ambulatory)    Chief Complaint:  Sasha Carrillo is a 63 y.o. female who presents today for follow-up of mood disorder. Patient last seen for follow-up on 3/26/2023. Met with patient.      Interval History and Content of Current Session:  Interim Events/Subjective Report/Content of Current Session:   Pt reports doing "good"  overall.  Notes that family thinks she may be getting manic.  Notes racing thoughts, increased talkativeness, denies increased activity, notes impulsivity (bought place ticket to visit brother in [place] on a whim).  Reports good mood, good anxiety.  Sleep good. Appetite good, weight stable.  Energy good, motivation good, concentration good.  Denies irritability, denies hopelessness.  Denies SI/HI/AVH/paranoia, denies plan or desire for self harm or harm to others.  Denies SE from current regimen. Denies somatic complaints.   Pt voices desire to adjust regimen to address ongoing symptoms.      Psychiatric Review of Systems-is patient experiencing or having changes in  Anxiety: good  Sleep: good  Appetite: increased  Weight: stable  Energy: good  Concentration: poor  Libido: no problem voiced  Irritability: denies  Motivation: good  Guilt/hopelessness: denies  Paranoia/delusions: denies  SIB/risky behavior: denies    Review of Systems   PSYCHIATRIC: Pertinant items are noted in the narrative.  CONSTITUTIONAL: No weight gain or loss.  MUSCULOSKELETAL: No pain or stiffness of the joints.  NEUROLOGIC: No weakness, sensory changes, seizures, confusion, memory loss, tremor or other abnormal movements.  CARDIAC: No CP, no palpitations  RESPIRATORY: No shortness of breath.  CARDIOVASCULAR: No tachycardia or chest pain.  GASTROINTESTINAL: No nausea, vomiting, pain, constipation or diarrhea.    Past Medical, Family and Social History: The patient's past medical, family and social history have been reviewed " "and updated as appropriate within the electronic medical record - see encounter notes.    Compliance: good    Side effects: tiredness from seroquel    Risk Parameters:  Patient reports no suicidal ideation  Patient reports no homicidal ideation  Patient reports no self-injurious behavior  Patient reports no violent behavior    Exam (detailed: at least 9 elements; comprehensive: all 15 elements)   Constitutional  Vitals:  Most recent vital signs, dated less than 90 days prior to this appointment, were reviewed.     Vitals:    08/10/23 1106 08/10/23 1109   BP: (!) 158/95 (!) 142/88   Pulse: 76 76   Temp: 98 °F (36.7 °C)    SpO2: 99%    Weight: 61.6 kg (135 lb 11.2 oz)         General:   Constitutional: No acute distress, appears stated age, casually dressed    Neurologic:   Motor: moves all extremities spontaneously and without difficulty, no abnormal involuntary movements observed  Gait: normal gait and station    Mental status examination:   Appearance: appears stated age, casually dressed, no acute distress  Behavior: unremarkable for situation, calm and cooperative  Mood: "better"  Affect: mood congruent, brighter  Thought process: linear and goal directed  Associations: appropriate for conversation  Thought content: no plan or desire for self harm or harm to others, denies paranoia, no delusional ideation volunteered  Perceptions: denies hallucinations or other altered perceptions  Orientation: oriented to day of week, month, year, location, and situation  Language: English, fluid  Attention: able to attend to interview  Insight: good  Judgement: good    PHQ9 8/10/2023   Total Score 0     GAD7 8/10/2023 3/29/2023 2/15/2023   1. Feeling nervous, anxious, or on edge? 0 3 2   2. Not being able to stop or control worrying? 0 2 3   3. Worrying too much about different things? 0 2 3   4. Trouble relaxing? 0 0 2   5. Being so restless that it is hard to sit still? 0 2 0   6. Becoming easily annoyed or irritable? 2 1 1 "   7. Feeling afraid as if something awful might happen? 0 0 0   8. If you checked off any problems, how difficult have these problems made it for you to do your work, take care of things at home, or get along with other people? 0 2 1   DANIELA-7 Score 2 10 11     Assessment and Diagnosis   Status/Progress: Based on the examination today, the patient's problem(s) is/are adequately but not ideally controlled.  New problems have been presented today.   Co-morbidities and Lack of compliance are not complicating management of the primary condition.  Number of separate conditions addressed during today's visit: 2 (anxiety well controlled, bipolar inadequately controlled).  Are medication adjustments being made today: Yes.  Are referral(s) being ordered today: No.  Complexity (level) of medical decision making employed in the encounter: HIGH.    General Impression:    ICD-10-CM ICD-9-CM   1. Bipolar disorder with depression  F31.9 296.50   2. Anxiety  F41.9 300.00     Intervention/Counseling/Treatment Plan   Continue seroquel XR 100mg nightly  Continue wellbutrin XL 150mg daily  Start depakote ER 500mg bid for mood stabilization, pt currently post menopausal, discussed potential SE including but not limited to sedation, weight gain, abdominal pain, damage to liver and pancreas, fetal abnormalities  Recommended pt continue treatment with [facility] provider for psychotherapy  Recent labwork in EMR reviewed, CBC w/ elevated WBC, CMP wnl, tSH wnl, hemoglobin a1c wnl, FLP w/ elevated TC and LDL  UDS negative at initial visit  AIMS 0 today  No need for PEC as pt is not an imminent danger to self or others or gravely disabled due to acute psychiatric illness  Discussed that pt should either call clinic for psychiatric crisis symptoms or present to nearest emergency room    Discussed with patient informed consent including diagnosis, risks and benefits of proposed treatment above vs. alternative treatments vs. no treatment, as well  as serious and common side effects of these treatments, and the inherent unpredictability of individual responses to these treatments. The patient expresses understanding of the above and displays the capacity to agree with this current plan. Patient also agrees that, currently, the benefits outweigh the risks and would like to pursue treatment at this time, and had no other questions.    Instructions:  Take all medications as prescribed.    Abstain from recreational drugs and alcohol.  Present to ED or call 911 for SI/HI plan or intent, psychosis, or medical emergency.    Return to Clinic: Follow up in about 6 weeks (around 9/21/2023).    Total time:     The total time for services performed on the date of the encounter: 30 minutes    Micheal Fairchild MD  [facility]

## 2023-08-18 ENCOUNTER — PATIENT MESSAGE (OUTPATIENT)
Dept: BEHAVIORAL HEALTH | Facility: CLINIC | Age: 63
End: 2023-08-18
Payer: MEDICAID

## 2023-08-18 ENCOUNTER — CLINICAL SUPPORT (OUTPATIENT)
Dept: FAMILY MEDICINE | Facility: CLINIC | Age: 63
End: 2023-08-18
Payer: MEDICAID

## 2023-08-18 DIAGNOSIS — F31.9 BIPOLAR DISORDER WITH DEPRESSION: ICD-10-CM

## 2023-08-18 LAB — VALPROATE SERPL-MCNC: 96.4 UG/ML (ref 50–100)

## 2023-08-18 PROCEDURE — 36415 COLL VENOUS BLD VENIPUNCTURE: CPT

## 2023-08-18 PROCEDURE — 80164 ASSAY DIPROPYLACETIC ACD TOT: CPT

## 2023-08-18 NOTE — PROGRESS NOTES
"When drawing pts labs this morning, she stated that since taking the Depakote 6 days ago, she has been experiencing a nervous stomach, nauseous, appetite change,   and "a lot of flatulence" (loud). Pt wanted provider aware.   "

## 2023-08-30 ENCOUNTER — PROCEDURE VISIT (OUTPATIENT)
Dept: FAMILY MEDICINE | Facility: CLINIC | Age: 63
End: 2023-08-30
Payer: MEDICAID

## 2023-08-30 VITALS
WEIGHT: 134.31 LBS | HEIGHT: 63 IN | DIASTOLIC BLOOD PRESSURE: 73 MMHG | HEART RATE: 64 BPM | OXYGEN SATURATION: 100 % | SYSTOLIC BLOOD PRESSURE: 112 MMHG | RESPIRATION RATE: 20 BRPM | BODY MASS INDEX: 23.8 KG/M2 | TEMPERATURE: 98 F

## 2023-08-30 DIAGNOSIS — N95.9 POST MENOPAUSAL PROBLEMS: ICD-10-CM

## 2023-08-30 DIAGNOSIS — Z12.4 ENCOUNTER FOR PAP SMEAR OF CERVIX WITH HPV DNA COTESTING: Primary | ICD-10-CM

## 2023-08-30 PROCEDURE — 88174 CYTOPATH C/V AUTO IN FLUID: CPT | Performed by: STUDENT IN AN ORGANIZED HEALTH CARE EDUCATION/TRAINING PROGRAM

## 2023-08-30 PROCEDURE — 99213 PR OFFICE/OUTPT VISIT, EST, LEVL III, 20-29 MIN: ICD-10-PCS | Mod: S$PBB,,, | Performed by: STUDENT IN AN ORGANIZED HEALTH CARE EDUCATION/TRAINING PROGRAM

## 2023-08-30 PROCEDURE — 99213 OFFICE O/P EST LOW 20 MIN: CPT | Mod: S$PBB,,, | Performed by: STUDENT IN AN ORGANIZED HEALTH CARE EDUCATION/TRAINING PROGRAM

## 2023-08-30 PROCEDURE — 87624 HPV HI-RISK TYP POOLED RSLT: CPT

## 2023-08-30 PROCEDURE — 87511 GARDNER VAG DNA AMP PROBE: CPT

## 2023-08-30 PROCEDURE — 87591 N.GONORRHOEAE DNA AMP PROB: CPT

## 2023-08-30 PROCEDURE — 87661 TRICHOMONAS VAGINALIS AMPLIF: CPT

## 2023-08-30 PROCEDURE — 87491 CHLMYD TRACH DNA AMP PROBE: CPT

## 2023-08-30 PROCEDURE — 87481 CANDIDA DNA AMP PROBE: CPT

## 2023-08-30 NOTE — ASSESSMENT & PLAN NOTE
Patient denies bleeding from vagina.  Will follow results of pap smear and then discuss options with patient regarding US  Patient reports she wants to check her estradiol levels and blood typing. Discussed that we typically refer to gyn for this and patient has been  2017.

## 2023-08-30 NOTE — PROCEDURES
"  Subjective:       Patient ID: Sasha Carrillo is a 63 y.o. female.    Chief Complaint:  Gynecologic Exam (LMP 2017.)      History of Present Illness  Patient presents to clinic today for Pap smear, Patient initially denied any gyn complaints but then states she has a whitish discharge at times.   Denies abdominal pain or odor  LMP 2017. Denies bleeding or spotting  GYN & OB History  No LMP recorded. Patient is postmenopausal.   Date of Last Pap: No result found    Review of patient's allergies indicates:  No Known Allergies  History reviewed. No pertinent past medical history.  OB History   No obstetric history on file.        Review of Systems  Review of Systems   Constitutional:  Negative for chills, fever and weight loss.   HENT:  Negative for ear discharge, nosebleeds and tinnitus.    Eyes:  Negative for blurred vision, photophobia and pain.   Respiratory:  Negative for cough, shortness of breath, wheezing and stridor.    Cardiovascular:  Negative for chest pain, palpitations and orthopnea.   Gastrointestinal:  Negative for abdominal pain, heartburn and nausea.   Genitourinary:  Negative for dysuria, frequency, hematuria and urgency.   Musculoskeletal:  Negative for falls and myalgias.   Skin:  Negative for itching and rash.   Neurological:  Negative for dizziness, sensory change, speech change, focal weakness, seizures, weakness and headaches.   Endo/Heme/Allergies:  Negative for environmental allergies. Does not bruise/bleed easily.   Psychiatric/Behavioral:  Negative for hallucinations and suicidal ideas.       Objective:      /73 (BP Location: Right arm, Patient Position: Sitting, BP Method: Medium (Automatic))   Pulse 64   Temp 98 °F (36.7 °C) (Oral)   Resp 20   Ht 5' 3" (1.6 m)   Wt 60.9 kg (134 lb 4.8 oz)   SpO2 100%   BMI 23.79 kg/m²   GENERAL: Well-developed female in no acute distress.  SKIN: Normal to inspection, warm and intact.  ABDOMEN: Soft, non tender.  VULVA: General appearance " normal; external genitalia with no lesions or erythema.  BLADDER: No tenderness.  VAGINA: Mucosa normal, pink, Scant  sanguinous Discharge noted in vagina.  discharge or lesions.  CERVIX: Grossly normal, pink, no erythema or abnormal discharge. Pap smear obtained  Rectal area with hemorrhoids. Not thrombosed   PSYCHIATRIC: Patient is oriented to person, place, and time. Patient has pressured speech, tangential conversations      Assessment:     Problem List Items Addressed This Visit          Renal/    Encounter for Pap smear of cervix with HPV DNA cotesting - Primary    Current Assessment & Plan     Patient denies bleeding from vagina.  Will follow results of pap smear and then discuss options with patient regarding US  Patient reports she wants to check her estradiol levels and blood typing. Discussed that we typically refer to gyn for this and patient has been  2017.          Relevant Orders    Liquid-Based Pap Smear, Screening Screening       Plan:   Sasha was seen today for gynecologic exam.    Diagnoses and all orders for this visit:    Encounter for Pap smear of cervix with HPV DNA cotesting  -     Liquid-Based Pap Smear, Screening Screening         Follow up for routine previously scheduled follow up, [facility]

## 2023-09-06 ENCOUNTER — HOSPITAL ENCOUNTER (EMERGENCY)
Facility: HOSPITAL | Age: 63
Discharge: HOME | End: 2023-09-06
Attending: FAMILY MEDICINE
Payer: MEDICAID

## 2023-09-06 ENCOUNTER — PATIENT MESSAGE (OUTPATIENT)
Dept: FAMILY MEDICINE | Facility: CLINIC | Age: 63
End: 2023-09-06
Payer: MEDICAID

## 2023-09-06 ENCOUNTER — TELEPHONE (OUTPATIENT)
Dept: FAMILY MEDICINE | Facility: CLINIC | Age: 63
End: 2023-09-06
Payer: MEDICAID

## 2023-09-06 VITALS
OXYGEN SATURATION: 94 % | BODY MASS INDEX: 23.34 KG/M2 | WEIGHT: 136.69 LBS | HEIGHT: 64 IN | SYSTOLIC BLOOD PRESSURE: 129 MMHG | RESPIRATION RATE: 17 BRPM | TEMPERATURE: 98 F | DIASTOLIC BLOOD PRESSURE: 72 MMHG | HEART RATE: 89 BPM

## 2023-09-06 DIAGNOSIS — F41.9 ACUTE ANXIETY: ICD-10-CM

## 2023-09-06 DIAGNOSIS — R07.89 ATYPICAL CHEST PAIN: ICD-10-CM

## 2023-09-06 DIAGNOSIS — K02.9 PAIN DUE TO DENTAL CARIES: Primary | ICD-10-CM

## 2023-09-06 DIAGNOSIS — R07.9 CHEST PAIN: ICD-10-CM

## 2023-09-06 LAB
ALBUMIN SERPL-MCNC: 3.8 G/DL (ref 3.4–4.8)
ALBUMIN/GLOB SERPL: 1.1 RATIO (ref 1.1–2)
ALP SERPL-CCNC: 66 UNIT/L (ref 40–150)
ALT SERPL-CCNC: 12 UNIT/L (ref 0–55)
APPEARANCE UR: CLEAR
AST SERPL-CCNC: 18 UNIT/L (ref 5–34)
BACTERIA #/AREA URNS AUTO: ABNORMAL /HPF
BASOPHILS # BLD AUTO: 0.08 X10(3)/MCL
BASOPHILS NFR BLD AUTO: 0.6 %
BILIRUB SERPL-MCNC: 0.2 MG/DL
BILIRUB UR QL STRIP.AUTO: NEGATIVE
BUN SERPL-MCNC: 13 MG/DL (ref 9.8–20.1)
CALCIUM SERPL-MCNC: 8.7 MG/DL (ref 8.4–10.2)
CHLORIDE SERPL-SCNC: 105 MMOL/L (ref 98–107)
CO2 SERPL-SCNC: 27 MMOL/L (ref 23–31)
COLOR UR: COLORLESS
CREAT SERPL-MCNC: 0.75 MG/DL (ref 0.55–1.02)
EOSINOPHIL # BLD AUTO: 0.22 X10(3)/MCL (ref 0–0.9)
EOSINOPHIL NFR BLD AUTO: 1.7 %
ERYTHROCYTE [DISTWIDTH] IN BLOOD BY AUTOMATED COUNT: 14.3 % (ref 11.5–17)
GFR SERPLBLD CREATININE-BSD FMLA CKD-EPI: >60 MLS/MIN/1.73/M2
GLOBULIN SER-MCNC: 3.6 GM/DL (ref 2.4–3.5)
GLUCOSE SERPL-MCNC: 100 MG/DL (ref 82–115)
GLUCOSE UR QL STRIP.AUTO: NORMAL
HCT VFR BLD AUTO: 40.2 % (ref 37–47)
HGB BLD-MCNC: 13.3 G/DL (ref 12–16)
HYALINE CASTS #/AREA URNS LPF: ABNORMAL /LPF
IMM GRANULOCYTES # BLD AUTO: 0.05 X10(3)/MCL (ref 0–0.04)
IMM GRANULOCYTES NFR BLD AUTO: 0.4 %
KETONES UR QL STRIP.AUTO: NEGATIVE
LEUKOCYTE ESTERASE UR QL STRIP.AUTO: 75
LYMPHOCYTES # BLD AUTO: 3.17 X10(3)/MCL (ref 0.6–4.6)
LYMPHOCYTES NFR BLD AUTO: 24.7 %
MCH RBC QN AUTO: 31.3 PG (ref 27–31)
MCHC RBC AUTO-ENTMCNC: 33.1 G/DL (ref 33–36)
MCV RBC AUTO: 94.6 FL (ref 80–94)
MONOCYTES # BLD AUTO: 1.28 X10(3)/MCL (ref 0.1–1.3)
MONOCYTES NFR BLD AUTO: 10 %
NEUTROPHILS # BLD AUTO: 8.01 X10(3)/MCL (ref 2.1–9.2)
NEUTROPHILS NFR BLD AUTO: 62.6 %
NITRITE UR QL STRIP.AUTO: NEGATIVE
NRBC BLD AUTO-RTO: 0 %
PH UR STRIP.AUTO: 7 [PH]
PLATELET # BLD AUTO: 330 X10(3)/MCL (ref 130–400)
PMV BLD AUTO: 10.1 FL (ref 7.4–10.4)
POTASSIUM SERPL-SCNC: 3.6 MMOL/L (ref 3.5–5.1)
PROT SERPL-MCNC: 7.4 GM/DL (ref 5.8–7.6)
PROT UR QL STRIP.AUTO: NEGATIVE
PSYCHE PATHOLOGY RESULT: NORMAL
RBC # BLD AUTO: 4.25 X10(6)/MCL (ref 4.2–5.4)
RBC #/AREA URNS AUTO: ABNORMAL /HPF
RBC UR QL AUTO: NEGATIVE
SODIUM SERPL-SCNC: 140 MMOL/L (ref 136–145)
SP GR UR STRIP.AUTO: 1.01 (ref 1–1.03)
SQUAMOUS #/AREA URNS LPF: ABNORMAL /HPF
TROPONIN I SERPL-MCNC: <0.01 NG/ML (ref 0–0.04)
UROBILINOGEN UR STRIP-ACNC: NORMAL
WBC # SPEC AUTO: 12.81 X10(3)/MCL (ref 4.5–11.5)
WBC #/AREA URNS AUTO: ABNORMAL /HPF

## 2023-09-06 PROCEDURE — 85025 COMPLETE CBC W/AUTO DIFF WBC: CPT | Performed by: FAMILY MEDICINE

## 2023-09-06 PROCEDURE — 80053 COMPREHEN METABOLIC PANEL: CPT | Performed by: FAMILY MEDICINE

## 2023-09-06 PROCEDURE — 99284 EMERGENCY DEPT VISIT MOD MDM: CPT

## 2023-09-06 PROCEDURE — 96372 THER/PROPH/DIAG INJ SC/IM: CPT | Performed by: FAMILY MEDICINE

## 2023-09-06 PROCEDURE — 84484 ASSAY OF TROPONIN QUANT: CPT | Performed by: FAMILY MEDICINE

## 2023-09-06 PROCEDURE — 25000003 PHARM REV CODE 250: Performed by: FAMILY MEDICINE

## 2023-09-06 PROCEDURE — 93005 ELECTROCARDIOGRAM TRACING: CPT

## 2023-09-06 PROCEDURE — 81001 URINALYSIS AUTO W/SCOPE: CPT | Performed by: FAMILY MEDICINE

## 2023-09-06 PROCEDURE — 63600175 PHARM REV CODE 636 W HCPCS: Performed by: FAMILY MEDICINE

## 2023-09-06 RX ORDER — IBUPROFEN 600 MG/1
600 TABLET ORAL EVERY 6 HOURS PRN
Qty: 40 TABLET | Refills: 0 | Status: SHIPPED | OUTPATIENT
Start: 2023-09-06 | End: 2023-09-16

## 2023-09-06 RX ORDER — HYDROXYZINE PAMOATE 25 MG/1
25 CAPSULE ORAL
Status: COMPLETED | OUTPATIENT
Start: 2023-09-06 | End: 2023-09-06

## 2023-09-06 RX ORDER — CHLORHEXIDINE GLUCONATE ORAL RINSE 1.2 MG/ML
15 SOLUTION DENTAL 2 TIMES DAILY
Qty: 300 ML | Refills: 0 | Status: SHIPPED | OUTPATIENT
Start: 2023-09-06 | End: 2023-09-16

## 2023-09-06 RX ORDER — HYDROXYZINE PAMOATE 25 MG/1
25 CAPSULE ORAL EVERY 6 HOURS PRN
Qty: 40 CAPSULE | Refills: 0 | Status: SHIPPED | OUTPATIENT
Start: 2023-09-06 | End: 2023-09-16

## 2023-09-06 RX ORDER — KETOROLAC TROMETHAMINE 30 MG/ML
60 INJECTION, SOLUTION INTRAMUSCULAR; INTRAVENOUS
Status: COMPLETED | OUTPATIENT
Start: 2023-09-06 | End: 2023-09-06

## 2023-09-06 RX ORDER — AMOXICILLIN 500 MG/1
500 CAPSULE ORAL 3 TIMES DAILY
Qty: 30 CAPSULE | Refills: 0 | Status: SHIPPED | OUTPATIENT
Start: 2023-09-06 | End: 2023-09-16

## 2023-09-06 RX ADMIN — HYDROXYZINE PAMOATE 25 MG: 25 CAPSULE ORAL at 02:09

## 2023-09-06 RX ADMIN — KETOROLAC TROMETHAMINE 60 MG: 30 INJECTION, SOLUTION INTRAMUSCULAR; INTRAVENOUS at 02:09

## 2023-09-06 NOTE — ED NOTES
Pt given discharge instructions. Pt instructed to follow up with pcp and return to er if any complications.

## 2023-09-06 NOTE — TELEPHONE ENCOUNTER
After talking with the patient and with Dr. Fairchild, he said that routing this request to Dr. Mcdaniel would be best.  The patient is saying due to the abscess in her mouth, it's giving her a lot of pain in her abdomen and headaches. She asked for a CT in the ED but they did not order one. She is now asking for a CT of her head and abdomen.  I let her know that Dr. Fairchild said this would be better handled by her PCP and that I would give them the message.  Pt verbalized understanding and insisted this be done in a timely manner.

## 2023-09-06 NOTE — ED PROVIDER NOTES
"Encounter Date: 9/6/2023       History     Chief Complaint   Patient presents with    Chest Pain    Abdominal Pain    Dental Pain     States laundry list of complaints.  States dental issues top right teeth.  States "got into argument with daughter on Friday and has been having chest pain and anxiety since."       Patient is a 63-year-old female presents emergency room for evaluation with complaints of dental pain, as well as anxiety.  Patient reports having dental pain on the right upper side that has been ongoing for weeks, also reports increased anxiety and chest pain for the past 5 days.  Denies dysuria or hematuria    The history is provided by the patient.     Review of patient's allergies indicates:  No Known Allergies  History reviewed. No pertinent past medical history.  History reviewed. No pertinent surgical history.  Family History   Problem Relation Age of Onset    Cancer Mother     COPD Father      Social History     Tobacco Use    Smoking status: Some Days     Types: Cigarettes     Passive exposure: Never    Smokeless tobacco: Never    Tobacco comments:     Quit 6 month ago   Substance Use Topics    Alcohol use: Not Currently     Alcohol/week: 6.0 standard drinks of alcohol     Types: 6 Cans of beer per week    Drug use: Not Currently     Types: Marijuana     Review of Systems   Constitutional:  Negative for chills, fatigue and fever.   HENT:  Positive for dental problem. Negative for ear pain, rhinorrhea and sore throat.    Eyes:  Negative for photophobia and pain.   Respiratory:  Negative for cough, shortness of breath and wheezing.    Cardiovascular:  Positive for chest pain.   Gastrointestinal:  Negative for abdominal pain, diarrhea, nausea and vomiting.   Genitourinary:  Negative for dysuria.   Neurological:  Negative for dizziness, weakness and headaches.   All other systems reviewed and are negative.      Physical Exam     Initial Vitals [09/06/23 0212]   BP Pulse Resp Temp SpO2   (!) 126/90 94 " 17 98.2 °F (36.8 °C) 97 %      MAP       --         Physical Exam    Nursing note and vitals reviewed.  Constitutional: She appears well-developed and well-nourished. No distress.   HENT:   Head: Normocephalic and atraumatic.   Mouth/Throat: Oropharynx is clear and moist.   Multiple dental caries right upper premolars and molars   Eyes: Conjunctivae and EOM are normal. Pupils are equal, round, and reactive to light.   Neck: Neck supple.   Normal range of motion.  Cardiovascular:  Normal rate, regular rhythm, normal heart sounds and intact distal pulses.     Exam reveals no gallop and no friction rub.       No murmur heard.  Pulmonary/Chest: Breath sounds normal. No respiratory distress. She has no wheezes. She has no rhonchi. She has no rales.   Abdominal: Abdomen is soft. Bowel sounds are normal. She exhibits no distension. There is no abdominal tenderness. There is no rebound and no guarding.   Musculoskeletal:         General: Normal range of motion.      Cervical back: Normal range of motion and neck supple.     Neurological: She is alert and oriented to person, place, and time.   Skin: Skin is warm and dry. Capillary refill takes less than 2 seconds. No erythema.   Psychiatric: She has a normal mood and affect. Her behavior is normal. Judgment and thought content normal.         ED Course   Procedures  Labs Reviewed   COMPREHENSIVE METABOLIC PANEL - Abnormal; Notable for the following components:       Result Value    Globulin 3.6 (*)     All other components within normal limits   CBC WITH DIFFERENTIAL - Abnormal; Notable for the following components:    WBC 12.81 (*)     MCV 94.6 (*)     MCH 31.3 (*)     IG# 0.05 (*)     All other components within normal limits   TROPONIN I - Normal   CBC W/ AUTO DIFFERENTIAL    Narrative:     The following orders were created for panel order CBC Auto Differential.  Procedure                               Abnormality         Status                     ---------                                -----------         ------                     CBC with Differential[[id number]]       Abnormal            Final result                 Please view results for these tests on the individual orders.   URINALYSIS, REFLEX TO URINE CULTURE     EKG Readings: (Independently Interpreted)   My Independent EKG Interpretation  09/06/2023 2:52 AM  Rate: 86 bpm  Rhythm: Sinus  Axis: normal  Intervals: Normal  ST Changes: Nonspecific  Impression: Normal sinus rhythm with nonspecific ST changes           Imaging Results    None          Medications   hydrOXYzine pamoate capsule 25 mg (25 mg Oral Given 9/6/23 0255)   ketorolac injection 60 mg (60 mg Intramuscular Given 9/6/23 0255)     Medical Decision Making  Patient is a 63-year-old female presents emergency room complaints of chest pain as well as dental pain.  Currently requesting to be admitted to the hospital.  Informed patient will obtain laboratory evaluation to ensure she does not have a medical emergency room.  Will continue to monitor.    Differential diagnosis:  Dental pain, atypical chest pain, acute anxiety    Amount and/or Complexity of Data Reviewed  Labs: ordered.    Risk  Prescription drug management.               ED Course as of 09/06/23 0430   Wed Sep 06, 2023   0430 No acute abnormality appreciated on labs; stable for discharge to home.  Reassurance given to patient.  ER precautions for any acute worsening. [MW]      ED Course User Index  [MW] Yoni Reilly MD                    Clinical Impression:   Final diagnoses:  [R07.9] Chest pain  [K02.9] Pain due to dental caries (Primary)  [F41.9] Acute anxiety  [R07.89] Atypical chest pain        ED Disposition Condition    Discharge Stable          ED Prescriptions       Medication Sig Dispense Start Date End Date Auth. Provider    hydrOXYzine pamoate (VISTARIL) 25 MG Cap Take 1 capsule (25 mg total) by mouth every 6 (six) hours as needed (anxiety). 40 capsule 9/6/2023 9/16/2023  Yoni Reilly MD    amoxicillin (AMOXIL) 500 MG capsule Take 1 capsule (500 mg total) by mouth 3 (three) times daily. for 10 days 30 capsule 9/6/2023 9/16/2023 Yoni Reilly MD    chlorhexidine (PERIDEX) 0.12 % solution Use as directed 15 mLs in the mouth or throat 2 (two) times daily. for 10 days 300 mL 9/6/2023 9/16/2023 Yoni Reilly MD    ibuprofen (ADVIL,MOTRIN) 600 MG tablet Take 1 tablet (600 mg total) by mouth every 6 (six) hours as needed for Pain. 40 tablet 9/6/2023 9/16/2023 Yoni Reilly MD          Follow-up Information       Follow up With Specialties Details Why Contact Info    Kayleigh Mcdaniel MD Family Medicine   [address] [place] [id number] [phone]      [facility] Emergency Medicine  As needed, If symptoms worsen [address] [facility] [id number] [phone]             Yoni Reilly MD  09/06/23 5202

## 2023-09-25 ENCOUNTER — HOSPITAL ENCOUNTER (EMERGENCY)
Facility: HOSPITAL | Age: 63
Discharge: HOME | End: 2023-09-25
Attending: FAMILY MEDICINE
Payer: MEDICAID

## 2023-09-25 VITALS
WEIGHT: 132.25 LBS | HEART RATE: 96 BPM | OXYGEN SATURATION: 99 % | HEIGHT: 64 IN | BODY MASS INDEX: 22.58 KG/M2 | RESPIRATION RATE: 18 BRPM | SYSTOLIC BLOOD PRESSURE: 165 MMHG | DIASTOLIC BLOOD PRESSURE: 92 MMHG | TEMPERATURE: 98 F
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
## 2023-09-25 DIAGNOSIS — S00.83XA FACIAL CONTUSION, INITIAL ENCOUNTER: ICD-10-CM

## 2023-09-25 DIAGNOSIS — R07.89 CHEST WALL PAIN: ICD-10-CM

## 2023-09-25 PROCEDURE — 99284 EMERGENCY DEPT VISIT MOD MDM: CPT | Mod: 25

## 2023-09-25 PROCEDURE — 25000003 PHARM REV CODE 250: Performed by: FAMILY MEDICINE

## 2023-09-25 RX ORDER — DICLOFENAC SODIUM 75 MG/1
75 TABLET, DELAYED RELEASE ORAL 2 TIMES DAILY PRN
Qty: 20 TABLET | Refills: 0 | Status: SHIPPED | OUTPATIENT
Start: 2023-09-25 | End: 2023-10-05

## 2023-09-25 RX ORDER — KETOROLAC TROMETHAMINE 10 MG/1
10 TABLET, FILM COATED ORAL
Status: COMPLETED | OUTPATIENT
Start: 2023-09-25 | End: 2023-09-25

## 2023-09-25 RX ORDER — CHLORHEXIDINE GLUCONATE ORAL RINSE 1.2 MG/ML
15 SOLUTION DENTAL 2 TIMES DAILY
Qty: 300 ML | Refills: 0 | Status: SHIPPED | OUTPATIENT
Start: 2023-09-25 | End: 2023-10-05

## 2023-09-25 RX ADMIN — KETOROLAC TROMETHAMINE 10 MG: 10 TABLET, FILM COATED ORAL at 10:09
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
## 2023-10-08 ENCOUNTER — HOSPITAL ENCOUNTER (EMERGENCY)
Facility: HOSPITAL | Age: 63
Discharge: HOME | End: 2023-10-09
Attending: EMERGENCY MEDICINE
Payer: MEDICAID

## 2023-10-08 DIAGNOSIS — M25.562 LEFT KNEE PAIN: ICD-10-CM

## 2023-10-08 DIAGNOSIS — S00.83XA FACIAL CONTUSION, INITIAL ENCOUNTER: ICD-10-CM

## 2023-10-08 DIAGNOSIS — S82.142A CLOSED FRACTURE OF TIBIAL PLATEAU, LEFT, INITIAL ENCOUNTER: Primary | ICD-10-CM

## 2023-10-08 DIAGNOSIS — S82.832A CLOSED FRACTURE FIBULA, HEAD, LEFT, INITIAL ENCOUNTER: ICD-10-CM

## 2023-10-08 LAB
ALBUMIN SERPL-MCNC: 4 G/DL (ref 3.4–4.8)
ALBUMIN/GLOB SERPL: 1.2 RATIO (ref 1.1–2)
ALP SERPL-CCNC: 76 UNIT/L (ref 40–150)
ALT SERPL-CCNC: 12 UNIT/L (ref 0–55)
AST SERPL-CCNC: 21 UNIT/L (ref 5–34)
BASOPHILS # BLD AUTO: 0.05 X10(3)/MCL
BASOPHILS NFR BLD AUTO: 0.5 %
BILIRUB SERPL-MCNC: 0.5 MG/DL
BUN SERPL-MCNC: 6.2 MG/DL (ref 9.8–20.1)
CALCIUM SERPL-MCNC: 8.9 MG/DL (ref 8.4–10.2)
CHLORIDE SERPL-SCNC: 105 MMOL/L (ref 98–107)
CO2 SERPL-SCNC: 24 MMOL/L (ref 23–31)
CREAT SERPL-MCNC: 0.75 MG/DL (ref 0.55–1.02)
EOSINOPHIL # BLD AUTO: 0.09 X10(3)/MCL (ref 0–0.9)
EOSINOPHIL NFR BLD AUTO: 1 %
ERYTHROCYTE [DISTWIDTH] IN BLOOD BY AUTOMATED COUNT: 15.1 % (ref 11.5–17)
GFR SERPLBLD CREATININE-BSD FMLA CKD-EPI: >60 MLS/MIN/1.73/M2
GLOBULIN SER-MCNC: 3.4 GM/DL (ref 2.4–3.5)
GLUCOSE SERPL-MCNC: 97 MG/DL (ref 82–115)
HCT VFR BLD AUTO: 41.8 % (ref 37–47)
HGB BLD-MCNC: 13.9 G/DL (ref 12–16)
IMM GRANULOCYTES # BLD AUTO: 0.04 X10(3)/MCL (ref 0–0.04)
IMM GRANULOCYTES NFR BLD AUTO: 0.4 %
LYMPHOCYTES # BLD AUTO: 2.43 X10(3)/MCL (ref 0.6–4.6)
LYMPHOCYTES NFR BLD AUTO: 26.4 %
MCH RBC QN AUTO: 31.4 PG (ref 27–31)
MCHC RBC AUTO-ENTMCNC: 33.3 G/DL (ref 33–36)
MCV RBC AUTO: 94.6 FL (ref 80–94)
MONOCYTES # BLD AUTO: 0.6 X10(3)/MCL (ref 0.1–1.3)
MONOCYTES NFR BLD AUTO: 6.5 %
NEUTROPHILS # BLD AUTO: 5.99 X10(3)/MCL (ref 2.1–9.2)
NEUTROPHILS NFR BLD AUTO: 65.2 %
NRBC BLD AUTO-RTO: 0 %
PLATELET # BLD AUTO: 394 X10(3)/MCL (ref 130–400)
PMV BLD AUTO: 10 FL (ref 7.4–10.4)
POTASSIUM SERPL-SCNC: 3.3 MMOL/L (ref 3.5–5.1)
PROT SERPL-MCNC: 7.4 GM/DL (ref 5.8–7.6)
RBC # BLD AUTO: 4.42 X10(6)/MCL (ref 4.2–5.4)
SODIUM SERPL-SCNC: 141 MMOL/L (ref 136–145)
WBC # SPEC AUTO: 9.2 X10(3)/MCL (ref 4.5–11.5)

## 2023-10-08 PROCEDURE — 99285 EMERGENCY DEPT VISIT HI MDM: CPT | Mod: 25

## 2023-10-08 PROCEDURE — 80053 COMPREHEN METABOLIC PANEL: CPT | Performed by: NURSE PRACTITIONER

## 2023-10-08 PROCEDURE — 29505 APPLICATION LONG LEG SPLINT: CPT | Mod: LT

## 2023-10-08 PROCEDURE — 25000003 PHARM REV CODE 250: Performed by: NURSE PRACTITIONER

## 2023-10-08 PROCEDURE — 85025 COMPLETE CBC W/AUTO DIFF WBC: CPT | Performed by: NURSE PRACTITIONER

## 2023-10-08 RX ORDER — HYDROCODONE BITARTRATE AND ACETAMINOPHEN 5; 325 MG/1; MG/1
1 TABLET ORAL
Status: COMPLETED | OUTPATIENT
Start: 2023-10-08 | End: 2023-10-08

## 2023-10-08 RX ORDER — HYDROCODONE BITARTRATE AND ACETAMINOPHEN 7.5; 325 MG/1; MG/1
1 TABLET ORAL EVERY 6 HOURS PRN
Qty: 15 TABLET | Refills: 0 | Status: ON HOLD | OUTPATIENT
Start: 2023-10-08 | End: 2023-10-19 | Stop reason: SDUPTHER

## 2023-10-08 RX ADMIN — HYDROCODONE BITARTRATE AND ACETAMINOPHEN 1 TABLET: 5; 325 TABLET ORAL at 09:10

## 2023-10-09 VITALS
TEMPERATURE: 99 F | HEART RATE: 98 BPM | HEIGHT: 64 IN | WEIGHT: 134 LBS | DIASTOLIC BLOOD PRESSURE: 89 MMHG | SYSTOLIC BLOOD PRESSURE: 136 MMHG | OXYGEN SATURATION: 99 % | RESPIRATION RATE: 18 BRPM | BODY MASS INDEX: 22.88 KG/M2

## 2023-10-09 PROCEDURE — 25000003 PHARM REV CODE 250: Performed by: NURSE PRACTITIONER

## 2023-10-09 RX ORDER — HYDROCODONE BITARTRATE AND ACETAMINOPHEN 5; 325 MG/1; MG/1
1 TABLET ORAL
Status: COMPLETED | OUTPATIENT
Start: 2023-10-09 | End: 2023-10-09

## 2023-10-09 RX ADMIN — HYDROCODONE BITARTRATE AND ACETAMINOPHEN 1 TABLET: 5; 325 TABLET ORAL at 12:10
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
## 2023-10-11 ENCOUNTER — OFFICE VISIT (OUTPATIENT)
Dept: ORTHOPEDICS | Facility: CLINIC | Age: 63
End: 2023-10-11
Payer: MEDICAID

## 2023-10-11 VITALS
OXYGEN SATURATION: 97 % | BODY MASS INDEX: 22.71 KG/M2 | SYSTOLIC BLOOD PRESSURE: 123 MMHG | HEART RATE: 91 BPM | RESPIRATION RATE: 20 BRPM | WEIGHT: 133 LBS | DIASTOLIC BLOOD PRESSURE: 72 MMHG | HEIGHT: 64 IN

## 2023-10-11 DIAGNOSIS — S82.832A CLOSED FRACTURE FIBULA, HEAD, LEFT, INITIAL ENCOUNTER: ICD-10-CM

## 2023-10-11 DIAGNOSIS — S82.142A CLOSED FRACTURE OF TIBIAL PLATEAU, LEFT, INITIAL ENCOUNTER: ICD-10-CM

## 2023-10-11 PROCEDURE — 3074F PR MOST RECENT SYSTOLIC BLOOD PRESSURE < 130 MM HG: ICD-10-PCS | Mod: CPTII,,, | Performed by: ORTHOPAEDIC SURGERY

## 2023-10-11 PROCEDURE — 3008F BODY MASS INDEX DOCD: CPT | Mod: CPTII,,, | Performed by: ORTHOPAEDIC SURGERY

## 2023-10-11 PROCEDURE — 3078F DIAST BP <80 MM HG: CPT | Mod: CPTII,,, | Performed by: ORTHOPAEDIC SURGERY

## 2023-10-11 PROCEDURE — 3074F SYST BP LT 130 MM HG: CPT | Mod: CPTII,,, | Performed by: ORTHOPAEDIC SURGERY

## 2023-10-11 PROCEDURE — 3078F PR MOST RECENT DIASTOLIC BLOOD PRESSURE < 80 MM HG: ICD-10-PCS | Mod: CPTII,,, | Performed by: ORTHOPAEDIC SURGERY

## 2023-10-11 PROCEDURE — 99204 OFFICE O/P NEW MOD 45 MIN: CPT | Mod: S$PBB,,, | Performed by: ORTHOPAEDIC SURGERY

## 2023-10-11 PROCEDURE — 3008F PR BODY MASS INDEX (BMI) DOCUMENTED: ICD-10-PCS | Mod: CPTII,,, | Performed by: ORTHOPAEDIC SURGERY

## 2023-10-11 PROCEDURE — 99204 PR OFFICE/OUTPT VISIT, NEW, LEVL IV, 45-59 MIN: ICD-10-PCS | Mod: S$PBB,,, | Performed by: ORTHOPAEDIC SURGERY

## 2023-10-11 PROCEDURE — 99213 OFFICE O/P EST LOW 20 MIN: CPT | Mod: PBBFAC

## 2023-10-11 PROCEDURE — 1159F MED LIST DOCD IN RCRD: CPT | Mod: CPTII,,, | Performed by: ORTHOPAEDIC SURGERY

## 2023-10-11 PROCEDURE — 1159F PR MEDICATION LIST DOCUMENTED IN MEDICAL RECORD: ICD-10-PCS | Mod: CPTII,,, | Performed by: ORTHOPAEDIC SURGERY

## 2023-10-11 RX ORDER — TRAMADOL HYDROCHLORIDE 50 MG/1
50 TABLET ORAL EVERY 6 HOURS PRN
Qty: 24 TABLET | Refills: 0 | Status: SHIPPED | OUTPATIENT
Start: 2023-10-11 | End: 2023-10-17

## 2023-10-11 NOTE — PROGRESS NOTES
Faculty Attestation: Sasha Carrillo  was seen at [facility] in the Orthopaedic Clinic. Patient seen and evaluated at the time of the visit. History of Present Illness, Physical Exam, and Assessment and Plan reviewed. Treatment plan is reasonable and appropriate. Compliance with treatment recommendations is important. No procedure was performed.     Frank Larsen MD  Orthopaedic Surgery
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
## 2023-10-16 ENCOUNTER — OFFICE VISIT (OUTPATIENT)
Dept: ORTHOPEDICS | Facility: CLINIC | Age: 63
End: 2023-10-16
Payer: MEDICAID

## 2023-10-16 ENCOUNTER — ANESTHESIA EVENT (OUTPATIENT)
Dept: SURGERY | Facility: HOSPITAL | Age: 63
End: 2023-10-16
Payer: MEDICAID

## 2023-10-16 VITALS
TEMPERATURE: 98 F | DIASTOLIC BLOOD PRESSURE: 78 MMHG | BODY MASS INDEX: 22.71 KG/M2 | HEIGHT: 64 IN | WEIGHT: 133 LBS | SYSTOLIC BLOOD PRESSURE: 124 MMHG | HEART RATE: 88 BPM

## 2023-10-16 DIAGNOSIS — S82.142A CLOSED FRACTURE OF TIBIAL PLATEAU, LEFT, INITIAL ENCOUNTER: Primary | ICD-10-CM

## 2023-10-16 PROCEDURE — 99214 PR OFFICE/OUTPT VISIT, EST, LEVL IV, 30-39 MIN: ICD-10-PCS | Mod: S$PBB,57,, | Performed by: ORTHOPAEDIC SURGERY

## 2023-10-16 PROCEDURE — 3074F PR MOST RECENT SYSTOLIC BLOOD PRESSURE < 130 MM HG: ICD-10-PCS | Mod: CPTII,,, | Performed by: ORTHOPAEDIC SURGERY

## 2023-10-16 PROCEDURE — 3074F SYST BP LT 130 MM HG: CPT | Mod: CPTII,,, | Performed by: ORTHOPAEDIC SURGERY

## 2023-10-16 PROCEDURE — 3008F PR BODY MASS INDEX (BMI) DOCUMENTED: ICD-10-PCS | Mod: CPTII,,, | Performed by: ORTHOPAEDIC SURGERY

## 2023-10-16 PROCEDURE — 3078F DIAST BP <80 MM HG: CPT | Mod: CPTII,,, | Performed by: ORTHOPAEDIC SURGERY

## 2023-10-16 PROCEDURE — 1159F PR MEDICATION LIST DOCUMENTED IN MEDICAL RECORD: ICD-10-PCS | Mod: CPTII,,, | Performed by: ORTHOPAEDIC SURGERY

## 2023-10-16 PROCEDURE — 99214 OFFICE O/P EST MOD 30 MIN: CPT | Mod: PBBFAC

## 2023-10-16 PROCEDURE — 1159F MED LIST DOCD IN RCRD: CPT | Mod: CPTII,,, | Performed by: ORTHOPAEDIC SURGERY

## 2023-10-16 PROCEDURE — 3008F BODY MASS INDEX DOCD: CPT | Mod: CPTII,,, | Performed by: ORTHOPAEDIC SURGERY

## 2023-10-16 PROCEDURE — 3078F PR MOST RECENT DIASTOLIC BLOOD PRESSURE < 80 MM HG: ICD-10-PCS | Mod: CPTII,,, | Performed by: ORTHOPAEDIC SURGERY

## 2023-10-16 PROCEDURE — 99214 OFFICE O/P EST MOD 30 MIN: CPT | Mod: S$PBB,57,, | Performed by: ORTHOPAEDIC SURGERY

## 2023-10-16 RX ORDER — HYDROXYZINE HYDROCHLORIDE 25 MG/1
25 TABLET, FILM COATED ORAL NIGHTLY
COMMUNITY

## 2023-10-16 RX ORDER — CEFAZOLIN SODIUM 2 G/50ML
2 SOLUTION INTRAVENOUS
Status: CANCELLED | OUTPATIENT
Start: 2023-10-16

## 2023-10-16 RX ORDER — MUPIROCIN 20 MG/G
OINTMENT TOPICAL
Status: CANCELLED | OUTPATIENT
Start: 2023-10-16

## 2023-10-16 NOTE — ANESTHESIA PREPROCEDURE EVALUATION
10/16/2023  Sasha Carrillo is a 63 y.o., female with PMHx of HLD, bipolar/anxiety presents for ORIF Lt tibial plateau fx.      Vitals:    10/19/23 0510 10/19/23 0524 10/19/23 0602   BP:  127/75 120/69   Pulse:  81 88   Resp:   20   Temp:  36.8 °C (98.2 °F) 36.3 °C (97.3 °F)   TempSrc:  Oral Temporal   SpO2:  97% 100%   Weight: 58.6 kg (129 lb 3.2 oz)         NO BETA BLOCKER USE    Active Ambulatory Problems     Diagnosis Date Noted    Primary insomnia 11/22/2022    Bipolar disorder with depression 11/22/2022    Seborrheic dermatitis 11/22/2022    Seborrheic keratoses 11/22/2022    Other hyperlipidemia 12/15/2022    Anxiety 02/15/2023    Encounter for Pap smear of cervix with HPV DNA cotesting 08/30/2023     Resolved Ambulatory Problems     Diagnosis Date Noted    No Resolved Ambulatory Problems     Past Medical History:   Diagnosis Date    Fractures     High cholesterol      Lab Results   Component Value Date    WBC 9.20 10/08/2023    HGB 13.9 10/08/2023    HCT 41.8 10/08/2023     10/08/2023    CHOL 223 (H) 11/22/2022    TRIG 68 11/22/2022    HDL 53 11/22/2022    ALT 12 10/08/2023    AST 21 10/08/2023     10/08/2023    K 3.3 (L) 10/08/2023    CREATININE 0.75 10/08/2023    BUN 6.2 (L) 10/08/2023    CO2 24 10/08/2023    TSH 0.8753 11/22/2022    HGBA1C 5.5 11/22/2022         Pre-op Assessment    I have reviewed the Patient Summary Reports.     I have reviewed the Nursing Notes. I have reviewed the NPO Status.   I have reviewed the Medications.     Review of Systems  Anesthesia Hx:  No problems with previous Anesthesia  History of prior surgery of interest to airway management or planning: Denies Family Hx of Anesthesia complications.   Denies Personal Hx of Anesthesia complications.   Hematology/Oncology:  Hematology Normal   Oncology Normal     EENT/Dental:EENT/Dental Normal    Cardiovascular:  Cardiovascular Normal     Pulmonary:  Pulmonary Normal    Renal/:  Renal/ Normal     Hepatic/GI:  Hepatic/GI Normal    Musculoskeletal:  Musculoskeletal Normal    Neurological:  Neurology Normal    Endocrine:  Endocrine Normal    Dermatological:  Skin Normal    Psych:  Psychiatric Normal           Physical Exam  General: Alert    Airway:  Mallampati: I / I  Mouth Opening: Normal  TM Distance: Normal  Tongue: Normal  Neck ROM: Normal ROM    Dental:  Intact      Lab Results   Component Value Date    WBC 9.20 10/08/2023    HGB 13.9 10/08/2023    HCT 41.8 10/08/2023    MCV 94.6 (H) 10/08/2023     10/08/2023       CMP  Sodium Level   Date Value Ref Range Status   10/08/2023 141 136 - 145 mmol/L Final     Potassium Level   Date Value Ref Range Status   10/08/2023 3.3 (L) 3.5 - 5.1 mmol/L Final     Carbon Dioxide   Date Value Ref Range Status   10/08/2023 24 23 - 31 mmol/L Final     Blood Urea Nitrogen   Date Value Ref Range Status   10/08/2023 6.2 (L) 9.8 - 20.1 mg/dL Final     Creatinine   Date Value Ref Range Status   10/08/2023 0.75 0.55 - 1.02 mg/dL Final     Calcium Level Total   Date Value Ref Range Status   10/08/2023 8.9 8.4 - 10.2 mg/dL Final     Albumin Level   Date Value Ref Range Status   10/08/2023 4.0 3.4 - 4.8 g/dL Final     Bilirubin Total   Date Value Ref Range Status   10/08/2023 0.5 <=1.5 mg/dL Final     Alkaline Phosphatase   Date Value Ref Range Status   10/08/2023 76 40 - 150 unit/L Final     Aspartate Aminotransferase   Date Value Ref Range Status   10/08/2023 21 5 - 34 unit/L Final     Alanine Aminotransferase   Date Value Ref Range Status   10/08/2023 12 0 - 55 unit/L Final     eGFR   Date Value Ref Range Status   10/08/2023 >60 mls/min/1.73/m2 Final           Anesthesia Plan  Type of Anesthesia, risks & benefits discussed:    Anesthesia Type: Gen ETT  Intra-op Monitoring Plan: Standard ASA Monitors  Post Op Pain Control Plan: multimodal analgesia and IV/PO Opioids  PRN  Induction:  IV  Airway Plan: Direct  Informed Consent: Informed consent signed with the Patient and all parties understand the risks and agree with anesthesia plan.  All questions answered. Patient consented to blood products? No  ASA Score: 2  Day of Surgery Review of History & Physical: H&P Update referred to the surgeon/provider.    Ready For Surgery From Anesthesia Perspective.     .

## 2023-10-16 NOTE — PROGRESS NOTES
Faculty Attestation: Sasha Carrillo  was seen at [facility] in the Orthopaedic Clinic. Patient seen and evaluated at the time of the visit. History of Present Illness, Physical Exam, and Assessment and Plan reviewed. Treatment plan is reasonable and appropriate. Compliance with treatment recommendations is important. No procedure was performed. To OR for ORIF L tibial plateau.  R/B/A discussed in detail.  All questions answered to patients satisfaction, no guarantees made.       Luis Garcia MD  Orthopaedic Surgery
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
## 2023-10-18 NOTE — PROGRESS NOTES
Ortho residents,  and Dr Bhakta are made aware  re: Sasha Carrillo on schedule for tomorrow for ORIF tibia plateau.  We are unable to contact her as her phone number rings busy each time we call to call to give her the arrival time

## 2023-10-19 ENCOUNTER — HOSPITAL ENCOUNTER (OUTPATIENT)
Facility: HOSPITAL | Age: 63
Discharge: HOME | End: 2023-10-19
Attending: ORTHOPAEDIC SURGERY | Admitting: ORTHOPAEDIC SURGERY
Payer: MEDICAID

## 2023-10-19 ENCOUNTER — ANESTHESIA (OUTPATIENT)
Dept: SURGERY | Facility: HOSPITAL | Age: 63
End: 2023-10-19
Payer: MEDICAID

## 2023-10-19 VITALS
TEMPERATURE: 98 F | BODY MASS INDEX: 22.18 KG/M2 | DIASTOLIC BLOOD PRESSURE: 83 MMHG | RESPIRATION RATE: 19 BRPM | SYSTOLIC BLOOD PRESSURE: 135 MMHG | HEART RATE: 84 BPM | OXYGEN SATURATION: 99 % | WEIGHT: 129.19 LBS

## 2023-10-19 DIAGNOSIS — S82.142A CLOSED FRACTURE OF TIBIAL PLATEAU, LEFT, INITIAL ENCOUNTER: ICD-10-CM

## 2023-10-19 DIAGNOSIS — S82.143A TIBIAL PLATEAU FRACTURE: ICD-10-CM

## 2023-10-19 DIAGNOSIS — Z01.810 PREOP CARDIOVASCULAR EXAM: ICD-10-CM

## 2023-10-19 LAB — TROPONIN I SERPL-MCNC: <0.01 NG/ML (ref 0–0.04)

## 2023-10-19 PROCEDURE — C1713 ANCHOR/SCREW BN/BN,TIS/BN: HCPCS | Performed by: ORTHOPAEDIC SURGERY

## 2023-10-19 PROCEDURE — D9220A PRA ANESTHESIA: Mod: CRNA,,, | Performed by: NURSE ANESTHETIST, CERTIFIED REGISTERED

## 2023-10-19 PROCEDURE — 37000009 HC ANESTHESIA EA ADD 15 MINS: Performed by: ORTHOPAEDIC SURGERY

## 2023-10-19 PROCEDURE — 63600175 PHARM REV CODE 636 W HCPCS: Performed by: NURSE ANESTHETIST, CERTIFIED REGISTERED

## 2023-10-19 PROCEDURE — 63600175 PHARM REV CODE 636 W HCPCS: Performed by: ORTHOPAEDIC SURGERY

## 2023-10-19 PROCEDURE — D9220A PRA ANESTHESIA: ICD-10-PCS | Mod: CRNA,,, | Performed by: NURSE ANESTHETIST, CERTIFIED REGISTERED

## 2023-10-19 PROCEDURE — 63600175 PHARM REV CODE 636 W HCPCS: Performed by: STUDENT IN AN ORGANIZED HEALTH CARE EDUCATION/TRAINING PROGRAM

## 2023-10-19 PROCEDURE — 27535 PR OPEN TX TIBIAL FRACTURE PROXIMAL UNICONDYLAR: ICD-10-PCS | Mod: LT,,, | Performed by: ORTHOPAEDIC SURGERY

## 2023-10-19 PROCEDURE — 27800903 OPTIME MED/SURG SUP & DEVICES OTHER IMPLANTS: Performed by: ORTHOPAEDIC SURGERY

## 2023-10-19 PROCEDURE — 37000008 HC ANESTHESIA 1ST 15 MINUTES: Performed by: ORTHOPAEDIC SURGERY

## 2023-10-19 PROCEDURE — 63600175 PHARM REV CODE 636 W HCPCS: Performed by: ANESTHESIOLOGY

## 2023-10-19 PROCEDURE — 63600175 PHARM REV CODE 636 W HCPCS: Performed by: SPECIALIST

## 2023-10-19 PROCEDURE — D9220A PRA ANESTHESIA: ICD-10-PCS | Mod: ANES,,, | Performed by: SPECIALIST

## 2023-10-19 PROCEDURE — 36000711: Performed by: ORTHOPAEDIC SURGERY

## 2023-10-19 PROCEDURE — 27201423 OPTIME MED/SURG SUP & DEVICES STERILE SUPPLY: Performed by: ORTHOPAEDIC SURGERY

## 2023-10-19 PROCEDURE — 36000710: Performed by: ORTHOPAEDIC SURGERY

## 2023-10-19 PROCEDURE — 25000003 PHARM REV CODE 250: Performed by: NURSE ANESTHETIST, CERTIFIED REGISTERED

## 2023-10-19 PROCEDURE — D9220A PRA ANESTHESIA: Mod: ANES,,, | Performed by: SPECIALIST

## 2023-10-19 PROCEDURE — 71000033 HC RECOVERY, INTIAL HOUR: Performed by: ORTHOPAEDIC SURGERY

## 2023-10-19 PROCEDURE — 27535 TREAT KNEE FRACTURE: CPT | Mod: LT,,, | Performed by: ORTHOPAEDIC SURGERY

## 2023-10-19 PROCEDURE — 71000016 HC POSTOP RECOV ADDL HR: Performed by: ORTHOPAEDIC SURGERY

## 2023-10-19 PROCEDURE — 25000003 PHARM REV CODE 250: Performed by: ORTHOPAEDIC SURGERY

## 2023-10-19 PROCEDURE — 71000015 HC POSTOP RECOV 1ST HR: Performed by: ORTHOPAEDIC SURGERY

## 2023-10-19 PROCEDURE — 93005 ELECTROCARDIOGRAM TRACING: CPT | Mod: 59

## 2023-10-19 PROCEDURE — 84484 ASSAY OF TROPONIN QUANT: CPT | Performed by: SPECIALIST

## 2023-10-19 DEVICE — IMPLANTABLE DEVICE: Type: IMPLANTABLE DEVICE | Site: TIBIA | Status: FUNCTIONAL

## 2023-10-19 DEVICE — SCREW CORTEX 3.5 X 30: Type: IMPLANTABLE DEVICE | Site: TIBIA | Status: FUNCTIONAL

## 2023-10-19 DEVICE — SCREW STRDRV VA LOK 3.5X65MM: Type: IMPLANTABLE DEVICE | Site: TIBIA | Status: FUNCTIONAL

## 2023-10-19 DEVICE — SCREW CORTEX 3.5 X 24: Type: IMPLANTABLE DEVICE | Site: TIBIA | Status: FUNCTIONAL

## 2023-10-19 DEVICE — SCREW BONE VA ST 3.5X60MM: Type: IMPLANTABLE DEVICE | Site: TIBIA | Status: FUNCTIONAL

## 2023-10-19 DEVICE — SCREW BONE VA ST 3.5X54MM: Type: IMPLANTABLE DEVICE | Site: TIBIA | Status: FUNCTIONAL

## 2023-10-19 DEVICE — SCREW BONE VA ST 3.5X50MM: Type: IMPLANTABLE DEVICE | Site: TIBIA | Status: FUNCTIONAL

## 2023-10-19 DEVICE — SCREW CORTEX 3.5 X 26: Type: IMPLANTABLE DEVICE | Site: TIBIA | Status: FUNCTIONAL

## 2023-10-19 RX ORDER — DIPHENHYDRAMINE HYDROCHLORIDE 50 MG/ML
25 INJECTION INTRAMUSCULAR; INTRAVENOUS ONCE AS NEEDED
Status: DISCONTINUED | OUTPATIENT
Start: 2023-10-19 | End: 2023-10-19 | Stop reason: HOSPADM

## 2023-10-19 RX ORDER — LIDOCAINE HYDROCHLORIDE 20 MG/ML
INJECTION INTRAVENOUS
Status: DISCONTINUED | OUTPATIENT
Start: 2023-10-19 | End: 2023-10-19

## 2023-10-19 RX ORDER — IPRATROPIUM BROMIDE AND ALBUTEROL SULFATE 2.5; .5 MG/3ML; MG/3ML
3 SOLUTION RESPIRATORY (INHALATION) ONCE AS NEEDED
Status: DISCONTINUED | OUTPATIENT
Start: 2023-10-19 | End: 2023-10-19 | Stop reason: HOSPADM

## 2023-10-19 RX ORDER — GABAPENTIN 300 MG/1
300 CAPSULE ORAL NIGHTLY
Qty: 30 CAPSULE | Refills: 0 | Status: SHIPPED | OUTPATIENT
Start: 2023-10-19 | End: 2024-10-18

## 2023-10-19 RX ORDER — HYDROMORPHONE HYDROCHLORIDE 1 MG/ML
0.5 INJECTION, SOLUTION INTRAMUSCULAR; INTRAVENOUS; SUBCUTANEOUS EVERY 5 MIN PRN
Status: DISCONTINUED | OUTPATIENT
Start: 2023-10-19 | End: 2023-10-19 | Stop reason: HOSPADM

## 2023-10-19 RX ORDER — KETOROLAC TROMETHAMINE 10 MG/1
10 TABLET, FILM COATED ORAL EVERY 6 HOURS PRN
Qty: 52 TABLET | Refills: 0 | Status: SHIPPED | OUTPATIENT
Start: 2023-10-19 | End: 2023-11-01

## 2023-10-19 RX ORDER — PROMETHAZINE HYDROCHLORIDE 25 MG/ML
INJECTION, SOLUTION INTRAMUSCULAR; INTRAVENOUS
Status: DISCONTINUED
Start: 2023-10-19 | End: 2023-10-19 | Stop reason: HOSPADM

## 2023-10-19 RX ORDER — PROPOFOL 10 MG/ML
INJECTION, EMULSION INTRAVENOUS
Status: DISCONTINUED | OUTPATIENT
Start: 2023-10-19 | End: 2023-10-19

## 2023-10-19 RX ORDER — HYDROMORPHONE HYDROCHLORIDE 1 MG/ML
0.2 INJECTION, SOLUTION INTRAMUSCULAR; INTRAVENOUS; SUBCUTANEOUS EVERY 5 MIN PRN
Status: DISCONTINUED | OUTPATIENT
Start: 2023-10-19 | End: 2023-10-19 | Stop reason: HOSPADM

## 2023-10-19 RX ORDER — KETOROLAC TROMETHAMINE 30 MG/ML
INJECTION, SOLUTION INTRAMUSCULAR; INTRAVENOUS
Status: DISCONTINUED | OUTPATIENT
Start: 2023-10-19 | End: 2023-10-19

## 2023-10-19 RX ORDER — SODIUM CHLORIDE, SODIUM LACTATE, POTASSIUM CHLORIDE, CALCIUM CHLORIDE 600; 310; 30; 20 MG/100ML; MG/100ML; MG/100ML; MG/100ML
INJECTION, SOLUTION INTRAVENOUS CONTINUOUS
Status: ACTIVE | OUTPATIENT
Start: 2023-10-19

## 2023-10-19 RX ORDER — CEFAZOLIN SODIUM 2 G/50ML
2 SOLUTION INTRAVENOUS
Status: COMPLETED | OUTPATIENT
Start: 2023-10-19 | End: 2023-10-19

## 2023-10-19 RX ORDER — PROMETHAZINE HYDROCHLORIDE 25 MG/ML
6.25 INJECTION, SOLUTION INTRAMUSCULAR; INTRAVENOUS ONCE
Status: DISCONTINUED | OUTPATIENT
Start: 2023-10-19 | End: 2023-10-19 | Stop reason: HOSPADM

## 2023-10-19 RX ORDER — HYDROMORPHONE HYDROCHLORIDE 1 MG/ML
INJECTION, SOLUTION INTRAMUSCULAR; INTRAVENOUS; SUBCUTANEOUS
Status: DISCONTINUED | OUTPATIENT
Start: 2023-10-19 | End: 2023-10-19

## 2023-10-19 RX ORDER — FENTANYL CITRATE 50 UG/ML
INJECTION, SOLUTION INTRAMUSCULAR; INTRAVENOUS
Status: DISCONTINUED | OUTPATIENT
Start: 2023-10-19 | End: 2023-10-19

## 2023-10-19 RX ORDER — METHOCARBAMOL 500 MG/1
500 TABLET, FILM COATED ORAL 4 TIMES DAILY
Qty: 52 TABLET | Refills: 0 | Status: SHIPPED | OUTPATIENT
Start: 2023-10-19 | End: 2023-11-01

## 2023-10-19 RX ORDER — MEPERIDINE HYDROCHLORIDE 25 MG/ML
12.5 INJECTION INTRAMUSCULAR; INTRAVENOUS; SUBCUTANEOUS ONCE
Status: COMPLETED | OUTPATIENT
Start: 2023-10-19 | End: 2023-10-19

## 2023-10-19 RX ORDER — PROCHLORPERAZINE EDISYLATE 5 MG/ML
5 INJECTION INTRAMUSCULAR; INTRAVENOUS ONCE AS NEEDED
Status: DISCONTINUED | OUTPATIENT
Start: 2023-10-19 | End: 2023-10-19 | Stop reason: HOSPADM

## 2023-10-19 RX ORDER — ONDANSETRON 2 MG/ML
4 INJECTION INTRAMUSCULAR; INTRAVENOUS ONCE
Status: DISCONTINUED | OUTPATIENT
Start: 2023-10-19 | End: 2023-10-19 | Stop reason: HOSPADM

## 2023-10-19 RX ORDER — OXYCODONE AND ACETAMINOPHEN 5; 325 MG/1; MG/1
2 TABLET ORAL ONCE
Status: DISCONTINUED | OUTPATIENT
Start: 2023-10-19 | End: 2023-10-19 | Stop reason: HOSPADM

## 2023-10-19 RX ORDER — DEXAMETHASONE SODIUM PHOSPHATE 4 MG/ML
INJECTION, SOLUTION INTRA-ARTICULAR; INTRALESIONAL; INTRAMUSCULAR; INTRAVENOUS; SOFT TISSUE
Status: DISCONTINUED | OUTPATIENT
Start: 2023-10-19 | End: 2023-10-19

## 2023-10-19 RX ORDER — BUPIVACAINE HYDROCHLORIDE 5 MG/ML
INJECTION, SOLUTION EPIDURAL; INTRACAUDAL
Status: DISCONTINUED | OUTPATIENT
Start: 2023-10-19 | End: 2023-10-19 | Stop reason: HOSPADM

## 2023-10-19 RX ORDER — ONDANSETRON 2 MG/ML
INJECTION INTRAMUSCULAR; INTRAVENOUS
Status: DISCONTINUED | OUTPATIENT
Start: 2023-10-19 | End: 2023-10-19

## 2023-10-19 RX ORDER — MIDAZOLAM HYDROCHLORIDE 1 MG/ML
2 INJECTION INTRAMUSCULAR; INTRAVENOUS ONCE AS NEEDED
Status: COMPLETED | OUTPATIENT
Start: 2023-10-19 | End: 2023-10-19

## 2023-10-19 RX ORDER — HYDROCODONE BITARTRATE AND ACETAMINOPHEN 7.5; 325 MG/1; MG/1
1 TABLET ORAL EVERY 6 HOURS PRN
Qty: 28 TABLET | Refills: 0 | Status: SHIPPED | OUTPATIENT
Start: 2023-10-19 | End: 2023-10-26

## 2023-10-19 RX ADMIN — HYDROMORPHONE HYDROCHLORIDE 0.5 MG: 1 INJECTION, SOLUTION INTRAMUSCULAR; INTRAVENOUS; SUBCUTANEOUS at 09:10

## 2023-10-19 RX ADMIN — KETOROLAC TROMETHAMINE 30 MG: 30 INJECTION, SOLUTION INTRAMUSCULAR at 07:10

## 2023-10-19 RX ADMIN — HYDROMORPHONE HYDROCHLORIDE 0.5 MG: 0.5 INJECTION, SOLUTION INTRAMUSCULAR; INTRAVENOUS; SUBCUTANEOUS at 09:10

## 2023-10-19 RX ADMIN — MIDAZOLAM HYDROCHLORIDE 2 MG: 1 INJECTION, SOLUTION INTRAMUSCULAR; INTRAVENOUS at 06:10

## 2023-10-19 RX ADMIN — ONDANSETRON 4 MG: 2 INJECTION INTRAMUSCULAR; INTRAVENOUS at 07:10

## 2023-10-19 RX ADMIN — MEPERIDINE HYDROCHLORIDE 12.5 MG: 25 INJECTION INTRAMUSCULAR; INTRAVENOUS; SUBCUTANEOUS at 09:10

## 2023-10-19 RX ADMIN — SODIUM CHLORIDE, POTASSIUM CHLORIDE, SODIUM LACTATE AND CALCIUM CHLORIDE: 600; 310; 30; 20 INJECTION, SOLUTION INTRAVENOUS at 09:10

## 2023-10-19 RX ADMIN — LIDOCAINE HYDROCHLORIDE 50 MG: 20 INJECTION INTRAVENOUS at 07:10

## 2023-10-19 RX ADMIN — SODIUM CHLORIDE, POTASSIUM CHLORIDE, SODIUM LACTATE AND CALCIUM CHLORIDE: 600; 310; 30; 20 INJECTION, SOLUTION INTRAVENOUS at 06:10

## 2023-10-19 RX ADMIN — CEFAZOLIN SODIUM 2 G: 2 SOLUTION INTRAVENOUS at 07:10

## 2023-10-19 RX ADMIN — FENTANYL CITRATE 50 MCG: 50 INJECTION INTRAMUSCULAR; INTRAVENOUS at 08:10

## 2023-10-19 RX ADMIN — DEXAMETHASONE SODIUM PHOSPHATE 8 MG: 4 INJECTION, SOLUTION INTRA-ARTICULAR; INTRALESIONAL; INTRAMUSCULAR; INTRAVENOUS; SOFT TISSUE at 07:10

## 2023-10-19 RX ADMIN — PROPOFOL 120 MG: 10 INJECTION, EMULSION INTRAVENOUS at 07:10

## 2023-10-19 RX ADMIN — FENTANYL CITRATE 50 MCG: 50 INJECTION INTRAMUSCULAR; INTRAVENOUS at 07:10

## 2023-10-19 NOTE — ANESTHESIA POSTPROCEDURE EVALUATION
Anesthesia Post Evaluation    Patient: Sasha Carrillo    Procedure(s) Performed: Procedure(s) (LRB):  ORIF, FRACTURE, TIBIA, PLATEAU (Left)    [facility] Anesthesia Post Op Evaluation      Vitals Value Taken Time   /74 10/19/23 1000   Temp 36.4 °C (97.5 °F) 10/19/23 0945   Pulse 90 10/19/23 1000   Resp 20 10/19/23 1000   SpO2 99 % 10/19/23 1000     Patient with complaints in PACU of chest heaviness 6/10, radiating to leg, no exacerbating or alleviating factors, VSS, EKG completed and unchanged from previous read, reports prior epidsodes of chest heaviness in past that were not related to exertion, has never seeked evaluation;  Troponin's WNL         No case tracking events are documented in the log.      Pain/Nadeem Score: Pain Rating Prior to Med Admin: 8 (10/19/2023  9:55 AM)  Nadeem Score: 10 (10/19/2023 10:06 AM)

## 2023-10-19 NOTE — INTERVAL H&P NOTE
The patient has been examined and the H&P has been reviewed:    I concur with the findings and no changes have occurred since H&P was written.    Surgery risks, benefits and alternative options discussed and understood by patient/family. Plan for left tibia ORIF           There are no hospital problems to display for this patient.

## 2023-10-19 NOTE — TRANSFER OF CARE
Anesthesia Transfer of Care Note    Patient: Sasha Carrillo    Procedure(s) Performed: Procedure(s) (LRB):  ORIF, FRACTURE, TIBIA, PLATEAU (Left)    Patient location: PACU    Anesthesia Type: general    Transport from OR: Transported from OR on room air with adequate spontaneous ventilation    Post pain: adequate analgesia    Post assessment: no apparent anesthetic complications    Post vital signs: stable    Level of consciousness: sedated    Nausea/Vomiting: no nausea/vomiting    Complications: none    Transfer of care protocol was followed

## 2023-10-19 NOTE — DISCHARGE INSTRUCTIONS
Keep dressing clean and dry until clinic visit   No weight bearing for 12 weeks   Keep leg elevated and ice 3-5 times a day for 15 minutes   Follow up in Orthopedic clinic in 2 weeks

## 2023-10-19 NOTE — ANESTHESIA POSTPROCEDURE EVALUATION
Anesthesia Post Evaluation    Patient: Sasha Carrillo    Procedure(s) Performed: Procedure(s) (LRB):  ORIF, FRACTURE, TIBIA, PLATEAU (Left)    Final Anesthesia Type: general      Patient location during evaluation: PACU  Patient participation: Yes- Able to Participate  Level of consciousness: awake and responds to stimulation  Post-procedure vital signs: reviewed and stable  Pain management: adequate  Airway patency: patent    PONV status at discharge: No PONV  Anesthetic complications: no      Cardiovascular status: blood pressure returned to baseline  Respiratory status: unassisted  Hydration status: euvolemic  Follow-up not needed.          Vitals Value Taken Time   /71 10/19/23 1009   Temp 36.4 °C (97.6 °F) 10/19/23 1009   Pulse 86 10/19/23 1009   Resp 16 10/19/23 1009   SpO2 97 % 10/19/23 1009         No case tracking events are documented in the log.      Pain/Nadeem Score: Pain Rating Prior to Med Admin: 8 (10/19/2023  9:55 AM)  Nadeem Score: 10 (10/19/2023 10:06 AM)

## 2023-10-19 NOTE — OP NOTE
Operative Note     Date of Service: 10/19/2023     Pre-Operative Diagnosis:  Closed left Schatzker 2 tibial plateau fracture    Post-Operative Diagnosis: Same     Procedure(s):   1. ORIF left tibial plateau     Anesthesia: General     Surgeon: MD Jose, was present and scrubbed for the key portions of the procedure.     Assistant(s):   Kaitlin Josue MD       Indications   Patient is a 63 y.o.female with left Schatzker 2 tibial plateau fracture. The patient was checked again in the Holding Room. The risks, benefits, complications, treatment options, and expected outcomes were reviewed again with the patient. The patient has elected to proceed with ORIF left tibial plateau. The risks and potential complications include but are not limited to infection, nerve injury, vascular injury, persistent pain, potential skin necrosis, deep vein thrombosis, possible pulmonary embolus, complications of the anesthetics and failure of the implants with potential need for future surgery to remove or revise. The patient concurred with the proposed plan, giving informed consent. The site of surgery was identified by the patient and properly noted/marked by me.     Implant:   1. Synthes lateral tibial plateau plate      Procedure Details:   The patient was taken to Operating Room. A Time-Out was held and the patient, location and procedure of ORIF left tibial plateau were verified and agreed upon by all members of the operating room staff. Prophylacic antibiotics were given.The patient was given general anesthesia.   Following the successful induction of anesthesia the patient was placed supine. The left lower extremity was prepped and draped in normal sterile manner. A lazy-S incision was made, extending down to the subcutaneous tissue. Bleeders were identified, isolated, and cauterized.  The muscle fascia was incised with a knife extending proximally through the ITB band.  Bovie dissection was used to subperiosteally expose the  tibia.  On initial inspection the split was seen with depression of the joint surface in meniscal tear.  The split was booked open and callus and soft tissue was non excisionally debrided using curettes and rongeur.  Then using a bone tamp under fluoroscopic guidance the joint surface was tamped up.  Then using 5 cc of cancellous allograft the void was filled.  After the split was then reduced and provisionally pinned in place.  Fluoroscopy was then used to check joint reduction which was appropriate.  Then under fluoroscopic guidance a lateral proximal tibial plateau plate was provisionally held to the tibia, a periarticular clamp was used to compress at the joint surface then the plate was pinned in place.  Fluoro was then used to check reduction and plate position which was appropriate.  Next the plate was fixated using locking screws at the proximal portion and cortical screws at the distal portion.  Final fluoroscopic images were taken which was appropriate.  The meniscus was sutured in place using 0 Vicryl suture.  The wound was then copiously irrigated with normal saline.  The muscle fascia and ITB band was closed with a 0 Vicryl.  Layered closure was with a 2-0 Monocryl for a subcutaneous layer and a 3-0 Prolene for the skin.  The incision was dressed with Xeroform, 4 x 4, ABD, cast padding, and an Ace.  A hinged knee brace was applied.    Instrument, sponge, and needle counts were correct prior to wound closure and at the conclusion of the case.   The patient was awoken from anesthesia, tolerated the procedure well and taken to recovery in stable condition.   Findings:  Closed left Schatzker 2 tibial plateau fracture   Estimated blood loss:  Less than 50 mL   Drains:  None   Total IV fluids: Per anesthesia records   Specimens:  None   Complications: None, pt tolerated the procedure well   Disposition: Awakened from anesthesia, and taken to the recovery room in a stable condition, having suffered no apparent  untoward event   Condition: Stable   Post-Operative Management   -discharge once appropriate by the PACU  -nonweightbearing left lower extremity for 12 weeks (01/11/2024)  -hinged knee brace restricted to 90° of flexion for the 1st 2 weeks then may unlock afterwards  -follow up in 2 weeks for a wound check, x-rays    Kaitlin Josue MD  [facility]

## 2023-10-19 NOTE — DISCHARGE SUMMARY
[facility]  Discharge Note  Short Stay    Procedure(s) (LRB):  ORIF, FRACTURE, TIBIA, PLATEAU (Left)      OUTCOME: Patient tolerated treatment/procedure well without complication and is now ready for discharge.    DISPOSITION: Home or Self Care    FINAL DIAGNOSIS:  <principal problem not specified>    FOLLOWUP: In clinic    DISCHARGE INSTRUCTIONS:    Discharge Procedure Orders   Diet Adult Regular     Weight bearing restrictions (specify):   Order Comments: No weight bearing left leg for 12 weeks        TIME SPENT ON DISCHARGE: 20 minutes    Kaitlin Josue MD

## 2023-10-19 NOTE — ANESTHESIA PROCEDURE NOTES
Intubation    Date/Time: 10/19/2023 7:05 AM    Performed by: Lyly Adame CRNA  Authorized by: Lyly Adame CRNA    Intubation:     Induction:  Intravenous    Intubated:  Postinduction    Mask Ventilation:  Not attempted    Attempts:  1    Attempted By:  CRNA    Difficult Airway Encountered?: No      Complications:  None    Airway Device:  Supraglottic airway/LMA    Airway Device Size:  4.0    Style/Cuff Inflation:  Uncuffed    Placement Verified By:  Capnometry    Complicating Factors:  None    Findings Post-Intubation:  BS equal bilateral and atraumatic/condition of teeth unchanged

## 2023-10-20 NOTE — PROGRESS NOTES
Faculty Attestation: I was present and scrubbed throughout the key elements of the procedure.  I agree with the resident's findings and description.    Luis Garcia  Orthopaedic Surgery

## 2023-10-20 NOTE — PROGRESS NOTES
I have seen the patient, reviewed the resident's discharge summary. I have personally interviewed and examined the patient at bedside and: agree with the findings.     Luis Garcia MD  Orthopedic Surgery

## 2023-10-31 DIAGNOSIS — F31.9 BIPOLAR DISORDER WITH DEPRESSION: Primary | ICD-10-CM

## 2023-10-31 RX ORDER — BUPROPION HYDROCHLORIDE 150 MG/1
150 TABLET ORAL DAILY
Qty: 30 TABLET | Refills: 11 | Status: SHIPPED | OUTPATIENT
Start: 2023-10-31 | End: 2024-10-30

## 2023-11-01 ENCOUNTER — OFFICE VISIT (OUTPATIENT)
Dept: ORTHOPEDICS | Facility: CLINIC | Age: 63
End: 2023-11-01
Payer: MEDICAID

## 2023-11-01 ENCOUNTER — HOSPITAL ENCOUNTER (OUTPATIENT)
Dept: RADIOLOGY | Facility: HOSPITAL | Age: 63
Discharge: HOME | End: 2023-11-01
Attending: STUDENT IN AN ORGANIZED HEALTH CARE EDUCATION/TRAINING PROGRAM
Payer: MEDICAID

## 2023-11-01 VITALS
HEART RATE: 80 BPM | WEIGHT: 129 LBS | DIASTOLIC BLOOD PRESSURE: 86 MMHG | OXYGEN SATURATION: 95 % | SYSTOLIC BLOOD PRESSURE: 132 MMHG | BODY MASS INDEX: 22.02 KG/M2 | RESPIRATION RATE: 20 BRPM | HEIGHT: 64 IN | TEMPERATURE: 98 F

## 2023-11-01 DIAGNOSIS — S82.142A CLOSED FRACTURE OF TIBIAL PLATEAU, LEFT, INITIAL ENCOUNTER: ICD-10-CM

## 2023-11-01 DIAGNOSIS — S82.142A CLOSED FRACTURE OF TIBIAL PLATEAU, LEFT, INITIAL ENCOUNTER: Primary | ICD-10-CM

## 2023-11-01 PROCEDURE — 99214 OFFICE O/P EST MOD 30 MIN: CPT | Mod: PBBFAC

## 2023-11-01 PROCEDURE — 3079F DIAST BP 80-89 MM HG: CPT | Mod: CPTII,,, | Performed by: ORTHOPAEDIC SURGERY

## 2023-11-01 PROCEDURE — 3075F SYST BP GE 130 - 139MM HG: CPT | Mod: CPTII,,, | Performed by: ORTHOPAEDIC SURGERY

## 2023-11-01 PROCEDURE — 1159F PR MEDICATION LIST DOCUMENTED IN MEDICAL RECORD: ICD-10-PCS | Mod: CPTII,,, | Performed by: ORTHOPAEDIC SURGERY

## 2023-11-01 PROCEDURE — 1159F MED LIST DOCD IN RCRD: CPT | Mod: CPTII,,, | Performed by: ORTHOPAEDIC SURGERY

## 2023-11-01 PROCEDURE — 99499 UNLISTED E&M SERVICE: CPT | Mod: ,,, | Performed by: ORTHOPAEDIC SURGERY

## 2023-11-01 PROCEDURE — 99499 NO LOS: ICD-10-PCS | Mod: ,,, | Performed by: ORTHOPAEDIC SURGERY

## 2023-11-01 PROCEDURE — 3079F PR MOST RECENT DIASTOLIC BLOOD PRESSURE 80-89 MM HG: ICD-10-PCS | Mod: CPTII,,, | Performed by: ORTHOPAEDIC SURGERY

## 2023-11-01 PROCEDURE — 3075F PR MOST RECENT SYSTOLIC BLOOD PRESS GE 130-139MM HG: ICD-10-PCS | Mod: CPTII,,, | Performed by: ORTHOPAEDIC SURGERY

## 2023-11-01 PROCEDURE — 73560 X-RAY EXAM OF KNEE 1 OR 2: CPT | Mod: TC,LT

## 2023-11-01 PROCEDURE — 73590 X-RAY EXAM OF LOWER LEG: CPT | Mod: TC,LT

## 2023-11-01 RX ORDER — TRAMADOL HYDROCHLORIDE 50 MG/1
50 TABLET ORAL EVERY 6 HOURS PRN
Qty: 28 TABLET | Refills: 0 | Status: SHIPPED | OUTPATIENT
Start: 2023-11-01 | End: 2023-11-08

## 2023-11-01 NOTE — PROGRESS NOTES
[facility]  Established Patient Office Visit  11/01/2023       Patient ID: Sasha Carrillo  YOB: 1960  MRN: [id number]    Chief Complaint: Post-op Evaluation of the Left Knee (10/19/23- ORIF left tibia plateau, pain level 6/10 brace and dressing intact to left knee. Takes Norco for pain, ambulates with crutches)      Past Orthopaedic Surgeries:  ORIF left tibial plateau    HPI:  Sasha Carrillo is a 63 y.o. female status post ORIF left tibial plateau 10/19/2023.  Patient has been doing well since surgery.  Pain gradually improving.  No issues with the incision.  No drainage from incision.  No fever nausea vomiting or chills.  No new falls or injuries.  She is been compliant with her weight-bearing precautions and has been staying in her hinged knee brace.    Past Medical History:    Past Medical History:   Diagnosis Date    Anxiety     Anxiety disorder, unspecified     Fractures     rigth big toe    High cholesterol      Past Surgical History:   Procedure Laterality Date    OPEN REDUCTION AND INTERNAL FIXATION (ORIF) OF FRACTURE OF TIBIAL PLATEAU Left 10/19/2023    Procedure: ORIF, FRACTURE, TIBIA, PLATEAU;  Surgeon: Luis Garcia MD;  Location: [facility];  Service: Orthopedics;  Laterality: Left;  C arm from opposite side, bone foam  Synthes    TONSILLECTOMY       Family History   Problem Relation Age of Onset    Cancer Mother     COPD Father      Social History     Socioeconomic History    Marital status: Single    Number of children: 3   Tobacco Use    Smoking status: Former     Current packs/day: 0.50     Types: Cigarettes     Passive exposure: Never    Smokeless tobacco: Never    Tobacco comments:     Quit 6 month ago   Substance and Sexual Activity    Alcohol use: Not Currently     Alcohol/week: 6.0 standard drinks of alcohol     Types: 6 Cans of beer per week    Drug use: Not Currently     Types: Marijuana     Comment: three days a week    Sexual activity: Not  Currently     Birth control/protection: Abstinence     Medication List with Changes/Refills   Current Medications    BUPROPION (WELLBUTRIN XL) 150 MG TB24 TABLET    Take 1 tablet (150 mg total) by mouth once daily.    DIVALPROEX 500 MG TB24    Take 1 tablet (500 mg total) by mouth 2 (two) times a day.    GABAPENTIN (NEURONTIN) 300 MG CAPSULE    Take 1 capsule (300 mg total) by mouth every evening.    HYDROXYZINE HCL (ATARAX) 25 MG TABLET    Take 25 mg by mouth every evening. Take 25 to 50 mg at bedtime as needed    KETOCONAZOLE (NIZORAL) 2 % SHAMPOO    Apply topically twice a week. Let sit 3-5 minutes then rinse    KETOROLAC (TORADOL) 10 MG TABLET    Take 1 tablet (10 mg total) by mouth every 6 (six) hours as needed for Pain.    METHOCARBAMOL (ROBAXIN) 500 MG TAB    Take 1 tablet (500 mg total) by mouth 4 (four) times daily. for 13 days    QUETIAPINE (SEROQUEL XR) 50 MG TB24    Take 2 tablets (100 mg total) by mouth once daily.    ROSUVASTATIN (CRESTOR) 5 MG TABLET    Take 1 tablet (5 mg total) by mouth once daily.     Review of patient's allergies indicates:  No Known Allergies    Physical Exam:    Left lower extremity   Well-healing surgical incision over the lateral tibia   No drainage erythema or dehiscence   Compartments soft   Motor intact EHL FHL TA GS  Sensation intact throughout the foot  2+ DP pulse   Mild tenderness to palpation throughout the proximal tibia    Imaging:  X-rays show maintain reduction, no further fracture displacement, no hardware failure in the tibial plateau    Assessment and Plan:    Sasha Carrillo is a 63 y.o. female status post ORIF left tibial plateau 10/19/2023.  Patient recovering appropriately from surgery   We will unlock hinged knee brace to allow full range of motion   Nonweightbearing 12 weeks total   We will leave stitches in for 1 more week in main incision, follow up in 1 week for suture removal  Work on range of motion, if still having significant stiffness can consider  physical therapy    Noé Wang MD  [facility] PGY-3          Orders Placed This Encounter    X-Ray Knee 1 or 2 View Left    X-Ray Tibia Fibula 2 View Left

## 2023-11-01 NOTE — PROGRESS NOTES
Faculty Attestation: Sasha Carrillo  was seen at [facility] in the Orthopaedic Clinic. Patient chart reviewed. History of Present Illness, Physical Exam, and Assessment and Plan reviewed. Treatment plan is reasonable and appropriate. Compliance with treatment recommendations is important. No procedure was performed.     Damian Gibbs MD  Orthopaedic Surgery

## 2023-11-08 ENCOUNTER — OFFICE VISIT (OUTPATIENT)
Dept: ORTHOPEDICS | Facility: CLINIC | Age: 63
End: 2023-11-08
Payer: MEDICAID

## 2023-11-08 VITALS
TEMPERATURE: 98 F | SYSTOLIC BLOOD PRESSURE: 126 MMHG | WEIGHT: 130 LBS | OXYGEN SATURATION: 95 % | HEIGHT: 64 IN | BODY MASS INDEX: 22.2 KG/M2 | RESPIRATION RATE: 18 BRPM | HEART RATE: 86 BPM | DIASTOLIC BLOOD PRESSURE: 66 MMHG

## 2023-11-08 DIAGNOSIS — S82.142A CLOSED FRACTURE OF TIBIAL PLATEAU, LEFT, INITIAL ENCOUNTER: Primary | ICD-10-CM

## 2023-11-08 PROCEDURE — 1159F PR MEDICATION LIST DOCUMENTED IN MEDICAL RECORD: ICD-10-PCS | Mod: CPTII,,, | Performed by: STUDENT IN AN ORGANIZED HEALTH CARE EDUCATION/TRAINING PROGRAM

## 2023-11-08 PROCEDURE — 3074F SYST BP LT 130 MM HG: CPT | Mod: CPTII,,, | Performed by: STUDENT IN AN ORGANIZED HEALTH CARE EDUCATION/TRAINING PROGRAM

## 2023-11-08 PROCEDURE — 99024 POSTOP FOLLOW-UP VISIT: CPT | Mod: ,,, | Performed by: STUDENT IN AN ORGANIZED HEALTH CARE EDUCATION/TRAINING PROGRAM

## 2023-11-08 PROCEDURE — 99024 PR POST-OP FOLLOW-UP VISIT: ICD-10-PCS | Mod: ,,, | Performed by: STUDENT IN AN ORGANIZED HEALTH CARE EDUCATION/TRAINING PROGRAM

## 2023-11-08 PROCEDURE — 3078F DIAST BP <80 MM HG: CPT | Mod: CPTII,,, | Performed by: STUDENT IN AN ORGANIZED HEALTH CARE EDUCATION/TRAINING PROGRAM

## 2023-11-08 PROCEDURE — 3074F PR MOST RECENT SYSTOLIC BLOOD PRESSURE < 130 MM HG: ICD-10-PCS | Mod: CPTII,,, | Performed by: STUDENT IN AN ORGANIZED HEALTH CARE EDUCATION/TRAINING PROGRAM

## 2023-11-08 PROCEDURE — 1159F MED LIST DOCD IN RCRD: CPT | Mod: CPTII,,, | Performed by: STUDENT IN AN ORGANIZED HEALTH CARE EDUCATION/TRAINING PROGRAM

## 2023-11-08 PROCEDURE — 99213 OFFICE O/P EST LOW 20 MIN: CPT | Mod: PBBFAC

## 2023-11-08 PROCEDURE — 3078F PR MOST RECENT DIASTOLIC BLOOD PRESSURE < 80 MM HG: ICD-10-PCS | Mod: CPTII,,, | Performed by: STUDENT IN AN ORGANIZED HEALTH CARE EDUCATION/TRAINING PROGRAM

## 2023-11-08 RX ORDER — METHOCARBAMOL 500 MG/1
500 TABLET, FILM COATED ORAL 4 TIMES DAILY
COMMUNITY
End: 2023-11-08 | Stop reason: SDUPTHER

## 2023-11-08 RX ORDER — METHOCARBAMOL 500 MG/1
500 TABLET, FILM COATED ORAL 4 TIMES DAILY
Qty: 40 TABLET | Refills: 0 | Status: SHIPPED | OUTPATIENT
Start: 2023-11-08

## 2023-11-08 NOTE — PROGRESS NOTES
Faculty Attestation: Sasha Carrillo  was seen at [facility] in the Orthopaedic Clinic. Patient seen and evaluated at the time of the visit. History of Present Illness, Physical Exam, and Assessment and Plan reviewed. Treatment plan is reasonable and appropriate. Compliance with treatment recommendations is important. No procedure was performed.     Dougie Riddle MD  Orthopaedic Surgery

## 2023-11-08 NOTE — PROGRESS NOTES
[facility]  Established Patient Office Visit  11/08/2023       Patient ID: Sasha Carrillo  YOB: 1960  MRN: [id number]    Chief Complaint: Post-op Evaluation of the Left Knee (ORIF left tibial plateau 10/19/23 Pain is being controlled and using crutches to ambulate . Having shooting pains when she stands. Using ice and elevation./ )      Past Orthopaedic Surgeries:  ORIF left tibial plateau    HPI:  Sasha Carrillo is a 63 y.o. female status post ORIF left tibial plateau 10/19/2023.  Patient has been doing well since surgery.  Pain gradually improving.  No issues with the incision.  No drainage from incision.  No fever nausea vomiting or chills.  No new falls or injuries.  She is been compliant with her weight-bearing precautions and has been staying in her hinged knee brace.    Past Medical History:    Past Medical History:   Diagnosis Date    Anxiety     Anxiety disorder, unspecified     Fractures     rigth big toe    High cholesterol      Past Surgical History:   Procedure Laterality Date    OPEN REDUCTION AND INTERNAL FIXATION (ORIF) OF FRACTURE OF TIBIAL PLATEAU Left 10/19/2023    Procedure: ORIF, FRACTURE, TIBIA, PLATEAU;  Surgeon: Luis Garcia MD;  Location: [facility];  Service: Orthopedics;  Laterality: Left;  C arm from opposite side, bone foam  Synthes    TONSILLECTOMY       Family History   Problem Relation Age of Onset    Cancer Mother     COPD Father      Social History     Socioeconomic History    Marital status: Single    Number of children: 3   Tobacco Use    Smoking status: Former     Current packs/day: 0.50     Types: Cigarettes     Passive exposure: Never    Smokeless tobacco: Never    Tobacco comments:     Quit 6 month ago   Substance and Sexual Activity    Alcohol use: Not Currently     Alcohol/week: 6.0 standard drinks of alcohol     Types: 6 Cans of beer per week    Drug use: Not Currently     Types: Marijuana     Comment: three days a week     Sexual activity: Not Currently     Birth control/protection: Abstinence     Medication List with Changes/Refills   Current Medications    BUPROPION (WELLBUTRIN XL) 150 MG TB24 TABLET    Take 1 tablet (150 mg total) by mouth once daily.    DIVALPROEX 500 MG TB24    Take 1 tablet (500 mg total) by mouth 2 (two) times a day.    GABAPENTIN (NEURONTIN) 300 MG CAPSULE    Take 1 capsule (300 mg total) by mouth every evening.    HYDROXYZINE HCL (ATARAX) 25 MG TABLET    Take 25 mg by mouth every evening. Take 25 to 50 mg at bedtime as needed    KETOCONAZOLE (NIZORAL) 2 % SHAMPOO    Apply topically twice a week. Let sit 3-5 minutes then rinse    METHOCARBAMOL (ROBAXIN) 500 MG TAB    Take 500 mg by mouth 4 (four) times daily.    QUETIAPINE (SEROQUEL XR) 50 MG TB24    Take 2 tablets (100 mg total) by mouth once daily.    ROSUVASTATIN (CRESTOR) 5 MG TABLET    Take 1 tablet (5 mg total) by mouth once daily.    TRAMADOL (ULTRAM) 50 MG TABLET    Take 1 tablet (50 mg total) by mouth every 6 (six) hours as needed for Pain.     Review of patient's allergies indicates:  No Known Allergies    Physical Exam:    Left lower extremity   Well-healing surgical incision over the lateral tibia   No drainage erythema or dehiscence   Compartments soft   Motor intact EHL FHL TA GS  Sensation intact throughout the foot  2+ DP pulse   Mild tenderness to palpation throughout the proximal tibia  Range of motion from 10-70 degrees      Assessment and Plan:    Sasah Carrillo is a 63 y.o. female status post ORIF left tibial plateau 10/19/2023.  Patient recovering appropriately from surgery   Remainder of sutures removed today in clinic   Continue use of hinged knee brace unlocked   Referred to physical therapy to work on range of motion of the knee   Continue nonweightbearing for 9 more weeks   Follow up in 4 weeks for repeat x-rays    Noé Wang MD  [employer] Orthopaedic Surgery PGY-3

## 2023-11-13 ENCOUNTER — TELEPHONE (OUTPATIENT)
Dept: FAMILY MEDICINE | Facility: CLINIC | Age: 63
End: 2023-11-13
Payer: MEDICAID

## 2023-11-13 RX ORDER — AMOXICILLIN 500 MG/1
500 TABLET, FILM COATED ORAL EVERY 8 HOURS
Qty: 30 TABLET | Refills: 0 | Status: SHIPPED | OUTPATIENT
Start: 2023-11-13 | End: 2023-11-23

## 2023-11-13 NOTE — TELEPHONE ENCOUNTER
----- Message from Raine Barksdale sent at 11/13/2023  8:23 AM CST -----  Regarding: Dental  Pt called, she is requesting a call from the nurse regarding an abscess on her tooth. Pt call back number [phone]. Stated she went to ER they gave her antibiotics but she is still having issues.

## 2023-11-13 NOTE — TELEPHONE ENCOUNTER
Patient states that she went to the ER over a month ago for a tooth abscess and she was put on some amoxicillin which helped. She states that over the weekend the abscess has returned and would like Dr. VELASQUEZ to call her in some more amoxicillin because she is her pcp. I let patient know that Dr. VELASQUEZ is out for the rest of the week and asked her if she had a dentist that could take care of this for her because it dental. Patient states that her dentist is only for her dentures and that she needs Dr. VELASQUEZ to send her something in. I also recommended that she maybe visit urgent care since Dr. VELASQUEZ is out. She states that she would like whoever is under Dr. VELASQUEZ right now to help her out with this. I let her know that I would consult with another provider and giver her a call back.

## 2023-12-06 ENCOUNTER — HOSPITAL ENCOUNTER (OUTPATIENT)
Dept: RADIOLOGY | Facility: HOSPITAL | Age: 63
Discharge: HOME | End: 2023-12-06
Attending: STUDENT IN AN ORGANIZED HEALTH CARE EDUCATION/TRAINING PROGRAM
Payer: MEDICAID

## 2023-12-06 ENCOUNTER — OFFICE VISIT (OUTPATIENT)
Dept: ORTHOPEDICS | Facility: CLINIC | Age: 63
End: 2023-12-06
Payer: MEDICAID

## 2023-12-06 VITALS
RESPIRATION RATE: 18 BRPM | BODY MASS INDEX: 22.2 KG/M2 | HEART RATE: 85 BPM | SYSTOLIC BLOOD PRESSURE: 126 MMHG | HEIGHT: 64 IN | DIASTOLIC BLOOD PRESSURE: 80 MMHG | OXYGEN SATURATION: 96 % | WEIGHT: 130 LBS | TEMPERATURE: 99 F

## 2023-12-06 DIAGNOSIS — M25.562 ACUTE PAIN OF LEFT KNEE: Primary | ICD-10-CM

## 2023-12-06 DIAGNOSIS — E78.5 HYPERLIPIDEMIA, UNSPECIFIED HYPERLIPIDEMIA TYPE: ICD-10-CM

## 2023-12-06 DIAGNOSIS — M25.562 ACUTE PAIN OF LEFT KNEE: ICD-10-CM

## 2023-12-06 PROCEDURE — 3074F PR MOST RECENT SYSTOLIC BLOOD PRESSURE < 130 MM HG: ICD-10-PCS | Mod: CPTII,,, | Performed by: SPECIALIST

## 2023-12-06 PROCEDURE — 3079F DIAST BP 80-89 MM HG: CPT | Mod: CPTII,,, | Performed by: SPECIALIST

## 2023-12-06 PROCEDURE — 99499 UNLISTED E&M SERVICE: CPT | Mod: ,,, | Performed by: SPECIALIST

## 2023-12-06 PROCEDURE — 3079F PR MOST RECENT DIASTOLIC BLOOD PRESSURE 80-89 MM HG: ICD-10-PCS | Mod: CPTII,,, | Performed by: SPECIALIST

## 2023-12-06 PROCEDURE — 73590 X-RAY EXAM OF LOWER LEG: CPT | Mod: TC,LT

## 2023-12-06 PROCEDURE — 1159F MED LIST DOCD IN RCRD: CPT | Mod: CPTII,,, | Performed by: SPECIALIST

## 2023-12-06 PROCEDURE — 99499 NO LOS: ICD-10-PCS | Mod: ,,, | Performed by: SPECIALIST

## 2023-12-06 PROCEDURE — 73564 X-RAY EXAM KNEE 4 OR MORE: CPT | Mod: TC,LT

## 2023-12-06 PROCEDURE — 1159F PR MEDICATION LIST DOCUMENTED IN MEDICAL RECORD: ICD-10-PCS | Mod: CPTII,,, | Performed by: SPECIALIST

## 2023-12-06 PROCEDURE — 99214 OFFICE O/P EST MOD 30 MIN: CPT | Mod: PBBFAC

## 2023-12-06 PROCEDURE — 3074F SYST BP LT 130 MM HG: CPT | Mod: CPTII,,, | Performed by: SPECIALIST

## 2023-12-06 NOTE — TELEPHONE ENCOUNTER
Care Due:                  Date            Visit Type   Department     Provider  --------------------------------------------------------------------------------                                ESTABLISHED                              PATIENT -    [facility]  Last Visit: 01-      [facility]      MEDICINE       Kayleigh Mcdaniel  Next Visit: None Scheduled  None         None Found                                                            Last  Test          Frequency    Reason                     Performed    Due Date  --------------------------------------------------------------------------------    Lipid Panel.  12 months..  rosuvastatin.............  11- 11-    [facility] Embedded Care Due Messages. Reference number: [id number].   12/06/2023 12:42:30 PM CST

## 2023-12-06 NOTE — PROGRESS NOTES
[facility]  Established Patient Office Visit  12/06/2023       Patient ID: Sasha Carrillo  YOB: 1960  MRN: [id number]    Chief Complaint: Follow-up, Pain, and Swelling of the Left Knee (F/U ORIF left knee 10/19/23, pain level 4/10 takes Ibuprofen as needed for pain PT 3x week)      Past Orthopaedic Surgeries:  ORIF left tibial plateau    HPI:  Sasha Carrillo is a 63 y.o. female status post ORIF left tibial plateau 10/19/2023.  Patient has been doing well since surgery.  Pain gradually improving.  No issues with the incision.  No drainage from incision.  No fever nausea vomiting or chills.  No new falls or injuries.  She is been compliant with her weight-bearing precautions and has been staying in her hinged knee brace.  She is been in therapy her range of motion has been improving her pain has been improving.  No new injuries.  She is been compliant with nonweightbearing in her brace.  Past Medical History:    Past Medical History:   Diagnosis Date    Anxiety     Anxiety disorder, unspecified     Fractures     rigth big toe    High cholesterol      Past Surgical History:   Procedure Laterality Date    OPEN REDUCTION AND INTERNAL FIXATION (ORIF) OF FRACTURE OF TIBIAL PLATEAU Left 10/19/2023    Procedure: ORIF, FRACTURE, TIBIA, PLATEAU;  Surgeon: Luis Garcia MD;  Location: [facility];  Service: Orthopedics;  Laterality: Left;  C arm from opposite side, bone foam  Synthes    TONSILLECTOMY       Family History   Problem Relation Age of Onset    Cancer Mother     COPD Father      Social History     Socioeconomic History    Marital status: Single    Number of children: 3   Tobacco Use    Smoking status: Former     Current packs/day: 0.50     Types: Cigarettes     Passive exposure: Never    Smokeless tobacco: Never    Tobacco comments:     Quit 6 month ago   Substance and Sexual Activity    Alcohol use: Not Currently     Alcohol/week: 6.0 standard drinks of alcohol      Types: 6 Cans of beer per week    Drug use: Not Currently     Types: Marijuana     Comment: three days a week    Sexual activity: Not Currently     Birth control/protection: Abstinence     Medication List with Changes/Refills   Current Medications    BUPROPION (WELLBUTRIN XL) 150 MG TB24 TABLET    Take 1 tablet (150 mg total) by mouth once daily.    DIVALPROEX 500 MG TB24    Take 1 tablet (500 mg total) by mouth 2 (two) times a day.    GABAPENTIN (NEURONTIN) 300 MG CAPSULE    Take 1 capsule (300 mg total) by mouth every evening.    HYDROXYZINE HCL (ATARAX) 25 MG TABLET    Take 25 mg by mouth every evening. Take 25 to 50 mg at bedtime as needed    KETOCONAZOLE (NIZORAL) 2 % SHAMPOO    Apply topically twice a week. Let sit 3-5 minutes then rinse    METHOCARBAMOL (ROBAXIN) 500 MG TAB    Take 1 tablet (500 mg total) by mouth 4 (four) times daily.    QUETIAPINE (SEROQUEL XR) 50 MG TB24    Take 2 tablets (100 mg total) by mouth once daily.    ROSUVASTATIN (CRESTOR) 5 MG TABLET    Take 1 tablet (5 mg total) by mouth once daily.     Review of patient's allergies indicates:  No Known Allergies    Physical Exam:    Left lower extremity   Well-healing surgical incision over the lateral tibia   No drainage erythema or dehiscence   Compartments soft   Motor intact EHL FHL TA GS  Sensation intact throughout the foot  2+ DP pulse   Mild tenderness to palpation throughout the proximal tibia  Range of motion from 10-90 degrees      Assessment and Plan:    Sasha Carrillo is a 63 y.o. female status post ORIF left tibial plateau 10/19/2023.  Patient recovering appropriately from surgery     Continue use of hinged knee brace unlocked   Referred to physical therapy to work on range of motion of the knee   Follow up in 4 weeks for repeat x-rays and possible advancement of weight-bearing    Noé Wang MD  [facility] PGY-3          Orders Placed This Encounter    X-Ray Knee Complete 4 or More Views Left    X-Ray Tibia  Fibula 2 View Left

## 2023-12-06 NOTE — TELEPHONE ENCOUNTER
Refill Routing Note   Medication(s) are not appropriate for processing by [facility] for the following reason(s):        ED/Hospital Visit since last OV with provider  Required labs outdated    [facility] action(s):  Defer     Requires labs : Yes             Appointments  past 12m or future 3m with PCP    Date Provider   Last Visit   8/30/2023 Kayleigh Mcdaniel MD   Next Visit   Visit date not found Kayleigh Mcdaniel MD   ED visits in past 90 days: 2        Note composed:12:58 PM 12/06/2023

## 2023-12-08 RX ORDER — ROSUVASTATIN CALCIUM 5 MG/1
5 TABLET, COATED ORAL DAILY
Qty: 90 TABLET | Refills: 0 | Status: SHIPPED | OUTPATIENT
Start: 2023-12-08 | End: 2024-12-07

## 2024-01-10 ENCOUNTER — HOSPITAL ENCOUNTER (OUTPATIENT)
Dept: RADIOLOGY | Facility: HOSPITAL | Age: 64
Discharge: HOME | End: 2024-01-10
Attending: SPECIALIST
Payer: MEDICAID

## 2024-01-10 ENCOUNTER — OFFICE VISIT (OUTPATIENT)
Dept: ORTHOPEDICS | Facility: CLINIC | Age: 64
End: 2024-01-10
Payer: MEDICAID

## 2024-01-10 VITALS
OXYGEN SATURATION: 97 % | RESPIRATION RATE: 20 BRPM | DIASTOLIC BLOOD PRESSURE: 69 MMHG | WEIGHT: 123.38 LBS | SYSTOLIC BLOOD PRESSURE: 124 MMHG | TEMPERATURE: 98 F | HEIGHT: 64 IN | HEART RATE: 70 BPM | BODY MASS INDEX: 21.06 KG/M2

## 2024-01-10 DIAGNOSIS — G89.29 CHRONIC PAIN OF LEFT KNEE: ICD-10-CM

## 2024-01-10 DIAGNOSIS — S82.142A CLOSED FRACTURE OF TIBIAL PLATEAU, LEFT, INITIAL ENCOUNTER: ICD-10-CM

## 2024-01-10 DIAGNOSIS — M25.562 CHRONIC PAIN OF LEFT KNEE: ICD-10-CM

## 2024-01-10 DIAGNOSIS — G89.29 CHRONIC PAIN OF LEFT KNEE: Primary | ICD-10-CM

## 2024-01-10 DIAGNOSIS — M25.562 CHRONIC PAIN OF LEFT KNEE: Primary | ICD-10-CM

## 2024-01-10 PROCEDURE — 3074F SYST BP LT 130 MM HG: CPT | Mod: CPTII,,, | Performed by: SPECIALIST

## 2024-01-10 PROCEDURE — 73564 X-RAY EXAM KNEE 4 OR MORE: CPT | Mod: TC,LT

## 2024-01-10 PROCEDURE — 99024 POSTOP FOLLOW-UP VISIT: CPT | Mod: ,,, | Performed by: SPECIALIST

## 2024-01-10 PROCEDURE — 3078F DIAST BP <80 MM HG: CPT | Mod: CPTII,,, | Performed by: SPECIALIST

## 2024-01-10 PROCEDURE — 1159F MED LIST DOCD IN RCRD: CPT | Mod: CPTII,,, | Performed by: SPECIALIST

## 2024-01-10 PROCEDURE — 73590 X-RAY EXAM OF LOWER LEG: CPT | Mod: TC,LT

## 2024-01-10 PROCEDURE — 99214 OFFICE O/P EST MOD 30 MIN: CPT | Mod: PBBFAC

## 2024-01-10 NOTE — PROGRESS NOTES
[facility]  Established Patient Office Visit  01/10/2024       Patient ID: Sasha Carrillo  YOB: 1960  MRN: [id number]    Chief Complaint: Follow-up of the Right Knee (F/u left knee surgery 10/19/23, brace on ambulates with crutches no c/o pain)      Past Orthopaedic Surgeries:  Open reduction internal fixation left tibial plateau 10/19/2023    HPI:  Sasha Carrillo is a 63 y.o. female who underwent open reduction internal fixation left tibial plateau 10/19/2023.  She has been doing well.  She has been going to physical therapy 3 times a week.  She has been wearing her brace.  She has been nonweightbearing.  She says she has minimal pain.  No numbness or tingling.    12 point ROS performed and negative except as above.     Past Medical History:    Past Medical History:   Diagnosis Date    Anxiety     Anxiety disorder, unspecified     Fractures     rigth big toe    High cholesterol      Past Surgical History:   Procedure Laterality Date    OPEN REDUCTION AND INTERNAL FIXATION (ORIF) OF FRACTURE OF TIBIAL PLATEAU Left 10/19/2023    Procedure: ORIF, FRACTURE, TIBIA, PLATEAU;  Surgeon: Luis Garcia MD;  Location: [facility];  Service: Orthopedics;  Laterality: Left;  C arm from opposite side, bone foam  Synthes    TONSILLECTOMY       Family History   Problem Relation Age of Onset    Cancer Mother     COPD Father      Social History     Socioeconomic History    Marital status: Single    Number of children: 3   Tobacco Use    Smoking status: Former     Current packs/day: 0.50     Types: Cigarettes     Passive exposure: Never    Smokeless tobacco: Never    Tobacco comments:     Quit 6 month ago   Substance and Sexual Activity    Alcohol use: Not Currently     Alcohol/week: 6.0 standard drinks of alcohol     Types: 6 Cans of beer per week    Drug use: Not Currently     Types: Marijuana     Comment: three days a week    Sexual activity: Not Currently     Birth control/protection:  Abstinence     Medication List with Changes/Refills   Current Medications    BUPROPION (WELLBUTRIN XL) 150 MG TB24 TABLET    Take 1 tablet (150 mg total) by mouth once daily.    DIVALPROEX 500 MG TB24    Take 1 tablet (500 mg total) by mouth 2 (two) times a day.    GABAPENTIN (NEURONTIN) 300 MG CAPSULE    Take 1 capsule (300 mg total) by mouth every evening.    HYDROXYZINE HCL (ATARAX) 25 MG TABLET    Take 25 mg by mouth every evening. Take 25 to 50 mg at bedtime as needed    KETOCONAZOLE (NIZORAL) 2 % SHAMPOO    Apply topically twice a week. Let sit 3-5 minutes then rinse    METHOCARBAMOL (ROBAXIN) 500 MG TAB    Take 1 tablet (500 mg total) by mouth 4 (four) times daily.    QUETIAPINE (SEROQUEL XR) 50 MG TB24    Take 2 tablets (100 mg total) by mouth once daily.    ROSUVASTATIN (CRESTOR) 5 MG TABLET    Take 1 tablet (5 mg total) by mouth once daily.     Review of patient's allergies indicates:  No Known Allergies    Physical Exam:    Left knee   well-healed surgical incision   No effusion  no tenderness medial joint line, lateral joint line  Knee range of motion 10 to 80, passively can get her to 85      Imaging independently interpreted:  X-rays of the left tib-fib and knee shows some early posttraumatic arthritis, healing of the fracture with hardware in place without failure    Assessment and Plan:    Sasha Carrillo is a 63 y.o. female status post open reduction internal fixation left tibial plateau 10/19/2023    Weight-bearing as tolerated left lower extremity  Physical therapy to work on range of motion, quad strengthening  Follow up in 2 months with a repeat x-rays and range of motion check    Lyndon Payne MD  [facility] PGY-3          Orders Placed This Encounter    X-Ray Knee Complete 4 or More Views Left    X-Ray Tibia Fibula 2 View Left    Ambulatory referral/consult to Physical/Occupational Therapy

## 2024-01-10 NOTE — PROGRESS NOTES
Faculty Attestation: Sasha Carrillo  was seen at [facility] in the Orthopaedic Clinic. Patient chart reviewed. History of Present Illness, Physical Exam, and Assessment and Plan reviewed. Treatment plan is reasonable and appropriate. Compliance with treatment recommendations is important. No procedure was performed.     Olegario Faith MD  Orthopaedic Surgery

## 2024-03-11 ENCOUNTER — HOSPITAL ENCOUNTER (OUTPATIENT)
Dept: RADIOLOGY | Facility: HOSPITAL | Age: 64
Discharge: HOME | End: 2024-03-11
Attending: STUDENT IN AN ORGANIZED HEALTH CARE EDUCATION/TRAINING PROGRAM
Payer: MEDICAID

## 2024-03-11 ENCOUNTER — OFFICE VISIT (OUTPATIENT)
Dept: ORTHOPEDICS | Facility: CLINIC | Age: 64
End: 2024-03-11
Payer: MEDICAID

## 2024-03-11 VITALS
WEIGHT: 120.63 LBS | BODY MASS INDEX: 20.6 KG/M2 | DIASTOLIC BLOOD PRESSURE: 65 MMHG | TEMPERATURE: 98 F | HEART RATE: 71 BPM | SYSTOLIC BLOOD PRESSURE: 133 MMHG | RESPIRATION RATE: 20 BRPM | OXYGEN SATURATION: 97 % | HEIGHT: 64 IN

## 2024-03-11 DIAGNOSIS — M25.562 CHRONIC PAIN OF LEFT KNEE: ICD-10-CM

## 2024-03-11 DIAGNOSIS — S82.142A CLOSED FRACTURE OF TIBIAL PLATEAU, LEFT, INITIAL ENCOUNTER: ICD-10-CM

## 2024-03-11 DIAGNOSIS — M25.562 CHRONIC PAIN OF LEFT KNEE: Primary | ICD-10-CM

## 2024-03-11 DIAGNOSIS — M25.662 KNEE STIFFNESS, LEFT: ICD-10-CM

## 2024-03-11 DIAGNOSIS — G89.29 CHRONIC PAIN OF LEFT KNEE: Primary | ICD-10-CM

## 2024-03-11 DIAGNOSIS — G89.29 CHRONIC PAIN OF LEFT KNEE: ICD-10-CM

## 2024-03-11 PROCEDURE — 73564 X-RAY EXAM KNEE 4 OR MORE: CPT | Mod: TC,LT

## 2024-03-11 PROCEDURE — 99213 OFFICE O/P EST LOW 20 MIN: CPT | Mod: S$PBB,,, | Performed by: STUDENT IN AN ORGANIZED HEALTH CARE EDUCATION/TRAINING PROGRAM

## 2024-03-11 PROCEDURE — 1159F MED LIST DOCD IN RCRD: CPT | Mod: CPTII,,, | Performed by: STUDENT IN AN ORGANIZED HEALTH CARE EDUCATION/TRAINING PROGRAM

## 2024-03-11 PROCEDURE — 3075F SYST BP GE 130 - 139MM HG: CPT | Mod: CPTII,,, | Performed by: STUDENT IN AN ORGANIZED HEALTH CARE EDUCATION/TRAINING PROGRAM

## 2024-03-11 PROCEDURE — 3008F BODY MASS INDEX DOCD: CPT | Mod: CPTII,,, | Performed by: STUDENT IN AN ORGANIZED HEALTH CARE EDUCATION/TRAINING PROGRAM

## 2024-03-11 PROCEDURE — 73590 X-RAY EXAM OF LOWER LEG: CPT | Mod: TC,LT

## 2024-03-11 PROCEDURE — 3078F DIAST BP <80 MM HG: CPT | Mod: CPTII,,, | Performed by: STUDENT IN AN ORGANIZED HEALTH CARE EDUCATION/TRAINING PROGRAM

## 2024-03-11 PROCEDURE — 99214 OFFICE O/P EST MOD 30 MIN: CPT | Mod: PBBFAC,25

## 2024-03-11 NOTE — PROGRESS NOTES
[facility]  Established Patient Office Visit  03/11/2024       Patient ID: Sasha Carrillo  YOB: 1960  MRN: [id number]    Chief Complaint: Follow-up and Swelling of the Left Knee (Follow-up ORIF left tibia plateau 10/19/23, no c/o pain ambulating without difficulty)      Past Orthopaedic Surgeries:  Open reduction internal fixation left tibial plateau 10/19/2023    HPI:  Sasha Carrillo is a 63 y.o. female who underwent open reduction internal fixation left tibial plateau 10/19/2023.     Interval:  She has been doing okay.  Her pain is controlled.  She has no pain.  Her main issue is that she can not get full flexion.  She is difficulty with going up stairs due to this.  She was recently completed physical therapy.  Her insurance will not cover any more sessions.  12 point ROS performed and negative except as above.     Past Medical History:    Past Medical History:   Diagnosis Date    Anxiety     Anxiety disorder, unspecified     Fractures     rigth big toe    High cholesterol      Past Surgical History:   Procedure Laterality Date    OPEN REDUCTION AND INTERNAL FIXATION (ORIF) OF FRACTURE OF TIBIAL PLATEAU Left 10/19/2023    Procedure: ORIF, FRACTURE, TIBIA, PLATEAU;  Surgeon: Luis Garcia MD;  Location: [facility];  Service: Orthopedics;  Laterality: Left;  C arm from opposite side, bone foam  Synthes    TONSILLECTOMY       Family History   Problem Relation Age of Onset    Cancer Mother     COPD Father      Social History     Socioeconomic History    Marital status: Single    Number of children: 3   Tobacco Use    Smoking status: Former     Current packs/day: 0.50     Types: Cigarettes     Passive exposure: Never    Smokeless tobacco: Never    Tobacco comments:     Quit 6 month ago   Substance and Sexual Activity    Alcohol use: Not Currently     Alcohol/week: 6.0 standard drinks of alcohol     Types: 6 Cans of beer per week    Drug use: Not Currently     Types:  Marijuana     Comment: three days a week    Sexual activity: Not Currently     Birth control/protection: Abstinence     Medication List with Changes/Refills   Current Medications    BUPROPION (WELLBUTRIN XL) 150 MG TB24 TABLET    Take 1 tablet (150 mg total) by mouth once daily.    DIVALPROEX 500 MG TB24    Take 1 tablet (500 mg total) by mouth 2 (two) times a day.    GABAPENTIN (NEURONTIN) 300 MG CAPSULE    Take 1 capsule (300 mg total) by mouth every evening.    HYDROXYZINE HCL (ATARAX) 25 MG TABLET    Take 25 mg by mouth every evening. Take 25 to 50 mg at bedtime as needed    KETOCONAZOLE (NIZORAL) 2 % SHAMPOO    Apply topically twice a week. Let sit 3-5 minutes then rinse    METHOCARBAMOL (ROBAXIN) 500 MG TAB    Take 1 tablet (500 mg total) by mouth 4 (four) times daily.    QUETIAPINE (SEROQUEL XR) 50 MG TB24    Take 2 tablets (100 mg total) by mouth once daily.    ROSUVASTATIN (CRESTOR) 5 MG TABLET    Take 1 tablet (5 mg total) by mouth once daily.     Review of patient's allergies indicates:  No Known Allergies    Physical Exam:    Left knee   well-healed surgical incision   No effusion  no tenderness medial joint line, lateral joint line  Knee range of motion 0 to 95 improved from prior  Neurovascularly intact distally      Imaging independently interpreted:  X-ray of the left tib-fib and knee show some early posttraumatic arthritis medially, otherwise fracture is well healed and hardware and alignment    Assessment and Plan:    Sasha Carrillo is a 63 y.o. female status post open reduction internal fixation left tibial plateau 10/19/2023 with stiffness      Discussed with her that we could get a Nicci splint for her left lower extremity to aid in her flexion, her lack of terminal flexion is what is causing her to have difficulty going up stairs.  We will fill out the paperwork for this.  She should continue going to physical therapy.  Also discussed with her that she was early posttraumatic arthritis and that  in the future she develops pain she may need a total knee replacement, but she is no where near that stage at this point.  Weight-bearing as tolerated left lower extremity  Follow up in 2 months with repeat x-rays and range of motion check    Lyndon Payne MD  [facility] PGY-3          Orders Placed This Encounter    X-Ray Knee Complete 4 or More Views Left    X-Ray Tibia Fibula 2 View Left

## 2024-03-11 NOTE — LETTER
2024        Lyndon Payne MD  [address] [place] [address] [facility]  [facility] [address]  Phone: [phone]   Patient: Sasha Carrillo   MR Number: [id number]   YOB: 1960   Date of Visit: 3/11/2024             This letter is in reference to Mrs. Sasha Carrillo,  1960. I am requesting approval for a left Knee Flexion Dynasplint for this patient as it is medically necessary. This patient has been under my care for tibial plateau fracture in 2023.  She has participated in physical therapy but still cannot achieve full flexion. The Dynasplint for use at home will benefit her left knee stiffness. The Dynasplints ordered are medically necessary for her rehabilitation to minimize the risk of contracture since decreased range of motion in the knee can affect gait as well as limit her ability for ADLs and contributes to pain. By using the Dynasplint she can increase her range of motion and decrease risk of contractures through the use of the Dynasplint's low-load prolonged duration stretch which constantly seeks the patient's available end range of motion and continues to gently stretch the muscles. No substitutions should be made.     Sincerely,      Lyndon Payne MD            CC    No Recipients    Enclosure

## 2024-03-13 PROBLEM — M25.662 KNEE STIFFNESS, LEFT: Status: ACTIVE | Noted: 2024-03-13

## 2024-05-15 ENCOUNTER — OFFICE VISIT (OUTPATIENT)
Dept: ORTHOPEDICS | Facility: CLINIC | Age: 64
End: 2024-05-15
Payer: MEDICAID

## 2024-05-15 ENCOUNTER — HOSPITAL ENCOUNTER (OUTPATIENT)
Dept: RADIOLOGY | Facility: HOSPITAL | Age: 64
Discharge: HOME | End: 2024-05-15
Attending: ORTHOPAEDIC SURGERY
Payer: MEDICAID

## 2024-05-15 VITALS
HEIGHT: 64 IN | TEMPERATURE: 99 F | DIASTOLIC BLOOD PRESSURE: 77 MMHG | SYSTOLIC BLOOD PRESSURE: 131 MMHG | BODY MASS INDEX: 20.83 KG/M2 | WEIGHT: 122 LBS

## 2024-05-15 DIAGNOSIS — M25.562 CHRONIC PAIN OF LEFT KNEE: Primary | ICD-10-CM

## 2024-05-15 DIAGNOSIS — G89.29 CHRONIC PAIN OF LEFT KNEE: ICD-10-CM

## 2024-05-15 DIAGNOSIS — M25.562 CHRONIC PAIN OF LEFT KNEE: ICD-10-CM

## 2024-05-15 DIAGNOSIS — G89.29 CHRONIC PAIN OF LEFT KNEE: Primary | ICD-10-CM

## 2024-05-15 PROCEDURE — 3008F BODY MASS INDEX DOCD: CPT | Mod: CPTII,,, | Performed by: ORTHOPAEDIC SURGERY

## 2024-05-15 PROCEDURE — 3078F DIAST BP <80 MM HG: CPT | Mod: CPTII,,, | Performed by: ORTHOPAEDIC SURGERY

## 2024-05-15 PROCEDURE — 3075F SYST BP GE 130 - 139MM HG: CPT | Mod: CPTII,,, | Performed by: ORTHOPAEDIC SURGERY

## 2024-05-15 PROCEDURE — 99213 OFFICE O/P EST LOW 20 MIN: CPT | Mod: PBBFAC,25

## 2024-05-15 PROCEDURE — 99499 UNLISTED E&M SERVICE: CPT | Mod: S$PBB,,, | Performed by: ORTHOPAEDIC SURGERY

## 2024-05-15 PROCEDURE — 1159F MED LIST DOCD IN RCRD: CPT | Mod: CPTII,,, | Performed by: ORTHOPAEDIC SURGERY

## 2024-05-15 PROCEDURE — 73564 X-RAY EXAM KNEE 4 OR MORE: CPT | Mod: TC,LT

## 2024-05-15 NOTE — PROGRESS NOTES
[facility]  Established Patient Office Visit  05/15/2024       Patient ID: Sasha Carrillo  YOB: 1960  MRN: [id number]    Chief Complaint: No chief complaint on file.      Orthopaedic Injuries:  1. Left Schatzker 2 tibial plateau fracture     Orthopaedic Surgeries:   1. ORIF left tibial plateau 10/19/2023    HPI:  Sasha Carrillo is a 64 y.o. female with above.  She did okay postoperatively, with some reduction in range of motion in terms of terminal flexion.  Here today for follow up.  She is doing very well, has finished physical therapy in the meantime.  Knee range of motion is improved.  She was struggling with stairs last time we saw her in clinic, this has improved although she reports sometimes needing to take them 1 at a time.  Has been doing scar massage.    12 point ROS performed and negative except as above.     Past Medical History:    Past Medical History:   Diagnosis Date    Anxiety     Fractures     rigth big toe    High cholesterol      Past Surgical History:   Procedure Laterality Date    OPEN REDUCTION AND INTERNAL FIXATION (ORIF) OF FRACTURE OF TIBIAL PLATEAU Left 10/19/2023    Procedure: ORIF, FRACTURE, TIBIA, PLATEAU;  Surgeon: Luis Garcia MD;  Location: [facility];  Service: Orthopedics;  Laterality: Left;  C arm from opposite side, bone foam  Synthes    TONSILLECTOMY       Family History   Problem Relation Name Age of Onset    Cancer Mother      COPD Father       Social History     Socioeconomic History    Marital status: Single    Number of children: 3   Tobacco Use    Smoking status: Former     Current packs/day: 0.50     Types: Cigarettes     Passive exposure: Never    Smokeless tobacco: Never    Tobacco comments:     Quit 6 month ago   Substance and Sexual Activity    Alcohol use: Not Currently     Alcohol/week: 6.0 standard drinks of alcohol     Types: 6 Cans of beer per week    Drug use: Not Currently     Types: Marijuana     Comment: three  days a week    Sexual activity: Not Currently     Birth control/protection: Abstinence     Medication List with Changes/Refills   Current Medications    BUPROPION (WELLBUTRIN XL) 150 MG TB24 TABLET    Take 1 tablet (150 mg total) by mouth once daily.    DIVALPROEX 500 MG TB24    Take 1 tablet (500 mg total) by mouth 2 (two) times a day.    GABAPENTIN (NEURONTIN) 300 MG CAPSULE    Take 1 capsule (300 mg total) by mouth every evening.    HYDROXYZINE HCL (ATARAX) 25 MG TABLET    Take 25 mg by mouth every evening. Take 25 to 50 mg at bedtime as needed    KETOCONAZOLE (NIZORAL) 2 % SHAMPOO    Apply topically twice a week. Let sit 3-5 minutes then rinse    METHOCARBAMOL (ROBAXIN) 500 MG TAB    Take 1 tablet (500 mg total) by mouth 4 (four) times daily.    QUETIAPINE (SEROQUEL XR) 50 MG TB24    Take 2 tablets (100 mg total) by mouth once daily.    ROSUVASTATIN (CRESTOR) 5 MG TABLET    Take 1 tablet (5 mg total) by mouth once daily.     Review of patient's allergies indicates:  No Known Allergies    Physical Exam:  Left knee:   Surgical incision is well healed   Minimal hardware prominence  Not tender to palpation over the plate   Nontender to palpation over medial/lateral joint line   Knee range of motion 0-100    Imaging independently interpreted:  X-ray left knee without evidence of hardware complication    Assessment and Plan:    Sasha Carrillo is a 64 y.o. female with above.    -imaging reviewed with the patient.  I discussed with her that I think her range of motion is very reasonable at this point given the injury.  I do not think there would be any additional benefit from Nicci splinting.  She is in agreement with this plan.  -she will call us back if she has any problems    WB Status:  Weightbearing as tolerated  Follow up:  As needed  XR/to-do next visit:  X-ray left knee    Inga Arreola MD  [employer] [facility] PGY3

## 2024-05-15 NOTE — PROGRESS NOTES
Faculty Attestation: Sasha Carrillo  was seen at [facility] in the Orthopaedic Clinic. History of Present Illness, Physical Exam, and Assessment and Plan reviewed. Treatment plan is reasonable and appropriate. Compliance with treatment recommendations is important. Discussed with the resident at the time of the visit.  No procedure was performed.     Shivam Mcginnis Jr, MD  Orthopaedic Surgery

## 2024-05-31 ENCOUNTER — OFFICE VISIT (OUTPATIENT)
Dept: FAMILY MEDICINE | Facility: CLINIC | Age: 64
End: 2024-05-31
Payer: MEDICAID

## 2024-05-31 VITALS
TEMPERATURE: 98 F | HEIGHT: 64 IN | OXYGEN SATURATION: 97 % | SYSTOLIC BLOOD PRESSURE: 137 MMHG | WEIGHT: 126 LBS | HEART RATE: 88 BPM | BODY MASS INDEX: 21.51 KG/M2 | DIASTOLIC BLOOD PRESSURE: 79 MMHG

## 2024-05-31 DIAGNOSIS — Z12.31 ENCOUNTER FOR SCREENING MAMMOGRAM FOR BREAST CANCER: Primary | ICD-10-CM

## 2024-05-31 DIAGNOSIS — M79.605 PAIN OF LEFT LOWER EXTREMITY: ICD-10-CM

## 2024-05-31 DIAGNOSIS — E78.49 OTHER HYPERLIPIDEMIA: ICD-10-CM

## 2024-05-31 DIAGNOSIS — Z23 ENCOUNTER FOR IMMUNIZATION: ICD-10-CM

## 2024-05-31 DIAGNOSIS — G62.9 NEUROPATHY: ICD-10-CM

## 2024-05-31 DIAGNOSIS — E78.5 HYPERLIPIDEMIA, UNSPECIFIED HYPERLIPIDEMIA TYPE: ICD-10-CM

## 2024-05-31 DIAGNOSIS — Z78.0 POSTMENOPAUSAL: ICD-10-CM

## 2024-05-31 DIAGNOSIS — S82.142A CLOSED FRACTURE OF TIBIAL PLATEAU, LEFT, INITIAL ENCOUNTER: ICD-10-CM

## 2024-05-31 DIAGNOSIS — Z00.00 WELLNESS EXAMINATION: ICD-10-CM

## 2024-05-31 DIAGNOSIS — Z72.51 UNPROTECTED SEX: ICD-10-CM

## 2024-05-31 DIAGNOSIS — F41.9 ANXIETY: ICD-10-CM

## 2024-05-31 DIAGNOSIS — R45.86 MOOD SWINGS: ICD-10-CM

## 2024-05-31 LAB
25(OH)D3+25(OH)D2 SERPL-MCNC: 19 NG/ML (ref 30–80)
ALBUMIN SERPL-MCNC: 4 G/DL (ref 3.4–4.8)
ALBUMIN/GLOB SERPL: 1.1 RATIO (ref 1.1–2)
ALP SERPL-CCNC: 90 UNIT/L (ref 40–150)
ALT SERPL-CCNC: 22 UNIT/L (ref 0–55)
ANION GAP SERPL CALC-SCNC: 6 MEQ/L
AST SERPL-CCNC: 21 UNIT/L (ref 5–34)
BACTERIA #/AREA URNS AUTO: ABNORMAL /HPF
BASOPHILS # BLD AUTO: 0.04 X10(3)/MCL
BASOPHILS NFR BLD AUTO: 0.5 %
BILIRUB SERPL-MCNC: 0.5 MG/DL
BILIRUB UR QL STRIP.AUTO: NEGATIVE
BUN SERPL-MCNC: 12.7 MG/DL (ref 9.8–20.1)
C TRACH DNA SPEC QL NAA+PROBE: NOT DETECTED
CALCIUM SERPL-MCNC: 9.1 MG/DL (ref 8.4–10.2)
CHLORIDE SERPL-SCNC: 107 MMOL/L (ref 98–107)
CHOLEST SERPL-MCNC: 169 MG/DL
CHOLEST/HDLC SERPL: 4 {RATIO} (ref 0–5)
CLARITY UR: CLEAR
CO2 SERPL-SCNC: 27 MMOL/L (ref 23–31)
COLOR UR AUTO: COLORLESS
CREAT SERPL-MCNC: 0.84 MG/DL (ref 0.55–1.02)
CREAT/UREA NIT SERPL: 15
EOSINOPHIL # BLD AUTO: 0.19 X10(3)/MCL (ref 0–0.9)
EOSINOPHIL NFR BLD AUTO: 2.3 %
ERYTHROCYTE [DISTWIDTH] IN BLOOD BY AUTOMATED COUNT: 14.1 % (ref 11.5–17)
EST. AVERAGE GLUCOSE BLD GHB EST-MCNC: 102.5 MG/DL
ESTRADIOL SERPL HS-MCNC: <24 PG/ML
FERRITIN SERPL-MCNC: 165.33 NG/ML (ref 4.63–204)
FSH SERPL-ACNC: 73.78 MIU/ML
GFR SERPLBLD CREATININE-BSD FMLA CKD-EPI: >60 ML/MIN/1.73/M2
GLOBULIN SER-MCNC: 3.7 GM/DL (ref 2.4–3.5)
GLUCOSE SERPL-MCNC: 102 MG/DL (ref 82–115)
GLUCOSE UR QL STRIP: NORMAL
HBA1C MFR BLD: 5.2 %
HCT VFR BLD AUTO: 40.1 % (ref 37–47)
HCV AB SERPL QL IA: NONREACTIVE
HDLC SERPL-MCNC: 40 MG/DL (ref 35–60)
HGB BLD-MCNC: 13.7 G/DL (ref 12–16)
HGB UR QL STRIP: NEGATIVE
HIV 1+2 AB+HIV1 P24 AG SERPL QL IA: NONREACTIVE
HYALINE CASTS #/AREA URNS LPF: ABNORMAL /LPF
IMM GRANULOCYTES # BLD AUTO: 0.02 X10(3)/MCL (ref 0–0.04)
IMM GRANULOCYTES NFR BLD AUTO: 0.2 %
KETONES UR QL STRIP: NEGATIVE
LDLC SERPL CALC-MCNC: 113 MG/DL (ref 50–140)
LEUKOCYTE ESTERASE UR QL STRIP: NEGATIVE
LH SERPL-ACNC: 27.56 MIU/ML
LYMPHOCYTES # BLD AUTO: 1.75 X10(3)/MCL (ref 0.6–4.6)
LYMPHOCYTES NFR BLD AUTO: 20.8 %
MCH RBC QN AUTO: 32.2 PG (ref 27–31)
MCHC RBC AUTO-ENTMCNC: 34.2 G/DL (ref 33–36)
MCV RBC AUTO: 94.4 FL (ref 80–94)
MONOCYTES # BLD AUTO: 0.67 X10(3)/MCL (ref 0.1–1.3)
MONOCYTES NFR BLD AUTO: 8 %
N GONORRHOEA DNA SPEC QL NAA+PROBE: NOT DETECTED
NEUTROPHILS # BLD AUTO: 5.73 X10(3)/MCL (ref 2.1–9.2)
NEUTROPHILS NFR BLD AUTO: 68.2 %
NITRITE UR QL STRIP: NEGATIVE
NRBC BLD AUTO-RTO: 0 %
PH UR STRIP: 6.5 [PH]
PLATELET # BLD AUTO: 315 X10(3)/MCL (ref 130–400)
PMV BLD AUTO: 9.9 FL (ref 7.4–10.4)
POTASSIUM SERPL-SCNC: 4.1 MMOL/L (ref 3.5–5.1)
PROGEST SERPL-MCNC: <0.5 NG/ML
PROT SERPL-MCNC: 7.7 GM/DL (ref 5.8–7.6)
PROT UR QL STRIP: NEGATIVE
RBC # BLD AUTO: 4.25 X10(6)/MCL (ref 4.2–5.4)
RBC #/AREA URNS AUTO: ABNORMAL /HPF
SODIUM SERPL-SCNC: 140 MMOL/L (ref 136–145)
SOURCE (OHS): NORMAL
SP GR UR STRIP.AUTO: 1 (ref 1–1.03)
SQUAMOUS #/AREA URNS LPF: ABNORMAL /HPF
T4 FREE SERPL-MCNC: 0.99 NG/DL (ref 0.7–1.48)
TRIGL SERPL-MCNC: 78 MG/DL (ref 37–140)
TSH SERPL-ACNC: 2 UIU/ML (ref 0.35–4.94)
UROBILINOGEN UR STRIP-ACNC: NORMAL
VLDLC SERPL CALC-MCNC: 16 MG/DL
WBC # SPEC AUTO: 8.4 X10(3)/MCL (ref 4.5–11.5)
WBC #/AREA URNS AUTO: ABNORMAL /HPF

## 2024-05-31 PROCEDURE — 85025 COMPLETE CBC W/AUTO DIFF WBC: CPT | Performed by: STUDENT IN AN ORGANIZED HEALTH CARE EDUCATION/TRAINING PROGRAM

## 2024-05-31 PROCEDURE — 82728 ASSAY OF FERRITIN: CPT | Performed by: STUDENT IN AN ORGANIZED HEALTH CARE EDUCATION/TRAINING PROGRAM

## 2024-05-31 PROCEDURE — 80053 COMPREHEN METABOLIC PANEL: CPT | Performed by: STUDENT IN AN ORGANIZED HEALTH CARE EDUCATION/TRAINING PROGRAM

## 2024-05-31 PROCEDURE — 83002 ASSAY OF GONADOTROPIN (LH): CPT | Performed by: STUDENT IN AN ORGANIZED HEALTH CARE EDUCATION/TRAINING PROGRAM

## 2024-05-31 PROCEDURE — 3075F SYST BP GE 130 - 139MM HG: CPT | Mod: CPTII,,, | Performed by: STUDENT IN AN ORGANIZED HEALTH CARE EDUCATION/TRAINING PROGRAM

## 2024-05-31 PROCEDURE — 83036 HEMOGLOBIN GLYCOSYLATED A1C: CPT | Performed by: STUDENT IN AN ORGANIZED HEALTH CARE EDUCATION/TRAINING PROGRAM

## 2024-05-31 PROCEDURE — 84144 ASSAY OF PROGESTERONE: CPT | Performed by: STUDENT IN AN ORGANIZED HEALTH CARE EDUCATION/TRAINING PROGRAM

## 2024-05-31 PROCEDURE — 80061 LIPID PANEL: CPT | Performed by: STUDENT IN AN ORGANIZED HEALTH CARE EDUCATION/TRAINING PROGRAM

## 2024-05-31 PROCEDURE — 99396 PREV VISIT EST AGE 40-64: CPT | Mod: S$PBB,,, | Performed by: STUDENT IN AN ORGANIZED HEALTH CARE EDUCATION/TRAINING PROGRAM

## 2024-05-31 PROCEDURE — 87389 HIV-1 AG W/HIV-1&-2 AB AG IA: CPT | Performed by: STUDENT IN AN ORGANIZED HEALTH CARE EDUCATION/TRAINING PROGRAM

## 2024-05-31 PROCEDURE — 82670 ASSAY OF TOTAL ESTRADIOL: CPT | Performed by: STUDENT IN AN ORGANIZED HEALTH CARE EDUCATION/TRAINING PROGRAM

## 2024-05-31 PROCEDURE — 36415 COLL VENOUS BLD VENIPUNCTURE: CPT | Performed by: STUDENT IN AN ORGANIZED HEALTH CARE EDUCATION/TRAINING PROGRAM

## 2024-05-31 PROCEDURE — 83001 ASSAY OF GONADOTROPIN (FSH): CPT | Performed by: STUDENT IN AN ORGANIZED HEALTH CARE EDUCATION/TRAINING PROGRAM

## 2024-05-31 PROCEDURE — 3008F BODY MASS INDEX DOCD: CPT | Mod: CPTII,,, | Performed by: STUDENT IN AN ORGANIZED HEALTH CARE EDUCATION/TRAINING PROGRAM

## 2024-05-31 PROCEDURE — 3044F HG A1C LEVEL LT 7.0%: CPT | Mod: CPTII,,, | Performed by: STUDENT IN AN ORGANIZED HEALTH CARE EDUCATION/TRAINING PROGRAM

## 2024-05-31 PROCEDURE — 84443 ASSAY THYROID STIM HORMONE: CPT | Performed by: STUDENT IN AN ORGANIZED HEALTH CARE EDUCATION/TRAINING PROGRAM

## 2024-05-31 PROCEDURE — 99213 OFFICE O/P EST LOW 20 MIN: CPT | Mod: PBBFAC,PN | Performed by: STUDENT IN AN ORGANIZED HEALTH CARE EDUCATION/TRAINING PROGRAM

## 2024-05-31 PROCEDURE — 87491 CHLMYD TRACH DNA AMP PROBE: CPT | Performed by: STUDENT IN AN ORGANIZED HEALTH CARE EDUCATION/TRAINING PROGRAM

## 2024-05-31 PROCEDURE — 86803 HEPATITIS C AB TEST: CPT | Performed by: STUDENT IN AN ORGANIZED HEALTH CARE EDUCATION/TRAINING PROGRAM

## 2024-05-31 PROCEDURE — 82306 VITAMIN D 25 HYDROXY: CPT | Performed by: STUDENT IN AN ORGANIZED HEALTH CARE EDUCATION/TRAINING PROGRAM

## 2024-05-31 PROCEDURE — 1159F MED LIST DOCD IN RCRD: CPT | Mod: CPTII,,, | Performed by: STUDENT IN AN ORGANIZED HEALTH CARE EDUCATION/TRAINING PROGRAM

## 2024-05-31 PROCEDURE — 99214 OFFICE O/P EST MOD 30 MIN: CPT | Mod: S$PBB,25,, | Performed by: STUDENT IN AN ORGANIZED HEALTH CARE EDUCATION/TRAINING PROGRAM

## 2024-05-31 PROCEDURE — 84439 ASSAY OF FREE THYROXINE: CPT | Performed by: STUDENT IN AN ORGANIZED HEALTH CARE EDUCATION/TRAINING PROGRAM

## 2024-05-31 PROCEDURE — 81001 URINALYSIS AUTO W/SCOPE: CPT | Performed by: STUDENT IN AN ORGANIZED HEALTH CARE EDUCATION/TRAINING PROGRAM

## 2024-05-31 PROCEDURE — 3078F DIAST BP <80 MM HG: CPT | Mod: CPTII,,, | Performed by: STUDENT IN AN ORGANIZED HEALTH CARE EDUCATION/TRAINING PROGRAM

## 2024-05-31 RX ORDER — IBUPROFEN 800 MG/1
800 TABLET ORAL 3 TIMES DAILY
Qty: 90 TABLET | Refills: 6 | Status: SHIPPED | OUTPATIENT
Start: 2024-05-31

## 2024-05-31 RX ORDER — ROSUVASTATIN CALCIUM 5 MG/1
5 TABLET, COATED ORAL DAILY
Qty: 90 TABLET | Refills: 1 | Status: SHIPPED | OUTPATIENT
Start: 2024-05-31 | End: 2024-12-27

## 2024-05-31 RX ORDER — MIRTAZAPINE 30 MG/1
30 TABLET, FILM COATED ORAL NIGHTLY
COMMUNITY
Start: 2024-05-02

## 2024-05-31 RX ORDER — LURASIDONE HYDROCHLORIDE 40 MG/1
40 TABLET, FILM COATED ORAL NIGHTLY
COMMUNITY
Start: 2024-05-21

## 2024-05-31 RX ORDER — GABAPENTIN 300 MG/1
300 CAPSULE ORAL NIGHTLY
Qty: 30 CAPSULE | Refills: 6 | Status: SHIPPED | OUTPATIENT
Start: 2024-05-31 | End: 2024-12-27

## 2024-05-31 NOTE — ASSESSMENT & PLAN NOTE
Discussed with patient that I do not provide chronic pain management however provided gabapentin 300 mg nightly as patient states that this does help the pain and discuss with patient that Toradol is not meant to be taken for more than 5 days but did give patient a prescription for ibuprofen 800 mg t.i.d.  Encourage patient to continue her stretching exercises as instructed by Orthopedics

## 2024-05-31 NOTE — ASSESSMENT & PLAN NOTE
Running female fatigue and hormonal panel labs   Discussed with patient that I do not provide hormonal therapy advised patient to contact the phone number in the back of her insurance card and call for providers who take her insurance   Patient also reports that she would self-pay for a visit for hormonal consideration   Gave patient information on doctor who does hormone replacement

## 2024-05-31 NOTE — PROGRESS NOTES
Patient Name: Sasha Carrillo     : 1960    MRN: [id number]     Subjective:     Patient ID: Sasha Carrillo is a 64 y.o. female.    Chief Complaint:   Chief Complaint   Patient presents with    Annual Exam     Patient complains of abscess in her mouth and is requesing amoxicillan because she has a dentist appt in a week and a half. She complains of leg pain after her surgery. She also wants complete blood work.wants to discuss something for her anxiety. Bp 137/79        HPI: HPI  64-year-old female presents to clinic for follow-up for annual wellness as well as new issues and complaints    Patient has not been seen in clinic for 2 years  Interval history  Patient was treated surgically for a left tibial plateau fracture 8 months ago  States she is still having pain asking for Toradol       Bipolar disorder? /anxiety depression  Also reports an inpatient stay at [facility] in which she was started on Latuda 40 mg in Remeron 30 mg   States that she is about to stop the Remeron because it is not helping states that Toradol helps her sleep  Also requesting medication for anxiety   [facility] and will have a visit in 4 days  Patient is very upset at this visit stating that her gynecology appointment got moved to 2025 states that she needs to figure out or hormones as she does not believe she has bipolar disorder so feels it has to be her hormones that need to be balanced  Requesting blood work today        Insomnia  Reports history of insomnia for which she has tried melatonin and Saint Russ's Wort  Is currently on Remeron for sleep  No SI or HI     Chronic issues not discussed   Right-sided tinnitus with balance issues  Reports history of right-sided tinnitus with pulsatile heartbeat  States that she will often feel off balance especially when she is getting up too quickly  Denies syncope or sensation of the room spinning  Was referred to audiology  but did not attend and said problem has ceased                SD  States she picks the skin away but notices that the redness and dry skin come back   Ketoconazole shampoo has relieved scaliness         HLD  Noted on labs  Crestor 5 mg started with good results         Patient reports being a former cigarette smoker  Surgical history-BTL  Family history-mother passed away from adenocarcinoma, father passed away from COPD        Health maintenance  Shingles vaccine-11/22/2022 will need repeat in  2-6 months  Influenza vaccine-11/22/2022  Pap smear-declines referral to Gynecology semi:      Assessment and plan:             Follow up in about 1 month (around 2/28/2023) for Pap smear .     ROS:      ROS     12 point review of systems conducted, negative except as stated in the history of present illness. See HPI for details.    History:     Past Medical History:   Diagnosis Date    Anxiety     Fractures     rigth big toe    High cholesterol         Past Surgical History:   Procedure Laterality Date    OPEN REDUCTION AND INTERNAL FIXATION (ORIF) OF FRACTURE OF TIBIAL PLATEAU Left 10/19/2023    Procedure: ORIF, FRACTURE, TIBIA, PLATEAU;  Surgeon: Luis Garcia MD;  Location: [facility];  Service: Orthopedics;  Laterality: Left;  C arm from opposite side, bone foam  Synthes    TONSILLECTOMY         Family History   Problem Relation Name Age of Onset    Cancer Mother      COPD Father          Social History     Tobacco Use    Smoking status: Former     Current packs/day: 0.50     Types: Cigarettes     Passive exposure: Never    Smokeless tobacco: Never    Tobacco comments:     Quit 6 month ago   Substance and Sexual Activity    Alcohol use: Not Currently     Alcohol/week: 6.0 standard drinks of alcohol     Types: 6 Cans of beer per week    Drug use: Not Currently     Types: Marijuana     Comment: three days a week    Sexual activity: Not Currently     Birth control/protection: Abstinence       Current Outpatient  "Medications   Medication Instructions    gabapentin (NEURONTIN) 300 mg, Oral, Nightly    ibuprofen (ADVIL,MOTRIN) 800 mg, Oral, 3 times daily    LATUDA 40 mg, Oral, Nightly    mirtazapine (REMERON) 30 mg, Oral, Nightly    rosuvastatin (CRESTOR) 5 mg, Oral, Daily        Review of patient's allergies indicates:  No Known Allergies    Objective:     Visit Vitals  /79 (BP Location: Right arm, Patient Position: Sitting)   Pulse 88   Temp 98.3 °F (36.8 °C) (Oral)   Ht 5' 4" (1.626 m)   Wt 57.2 kg (126 lb)   SpO2 97%   BMI 21.63 kg/m²       Physical Examination:     Physical Exam  Constitutional:       General: She is not in acute distress.  Eyes:      General: No scleral icterus.     Extraocular Movements: Extraocular movements intact.      Conjunctiva/sclera: Conjunctivae normal.      Pupils: Pupils are equal, round, and reactive to light.   Cardiovascular:      Rate and Rhythm: Normal rate and regular rhythm.      Heart sounds: No murmur heard.  Pulmonary:      Effort: Pulmonary effort is normal. No respiratory distress.      Breath sounds: No stridor. No wheezing or rhonchi.   Abdominal:      General: Bowel sounds are normal. There is no distension.      Palpations: Abdomen is soft.      Tenderness: There is no abdominal tenderness. There is no guarding.   Musculoskeletal:         General: No swelling. Normal range of motion.   Skin:     General: Skin is warm and dry.      Coloration: Skin is not jaundiced.      Findings: No rash.   Neurological:      Mental Status: She is alert.      Gait: Gait normal.   Psychiatric:         Thought Content: Thought content normal.      Comments: Patient is upset when discussing length of time to have gynecology appointment         Lab Results:     Chemistry:  Lab Results   Component Value Date     10/08/2023    K 3.3 (L) 10/08/2023    BUN 6.2 (L) 10/08/2023    CREATININE 0.75 10/08/2023    EGFRNORACEVR >60 10/08/2023    GLUCOSE 97 10/08/2023    CALCIUM 8.9 10/08/2023    " ALKPHOS 76 10/08/2023    LABPROT 7.4 10/08/2023    ALBUMIN 4.0 10/08/2023    AST 21 10/08/2023    ALT 12 10/08/2023    TSH 0.8753 11/22/2022    CDFDAP8PKKV 0.99 11/22/2022        Lab Results   Component Value Date    HGBA1C 5.5 11/22/2022        Hematology:  Lab Results   Component Value Date    WBC 9.20 10/08/2023    HGB 13.9 10/08/2023    HCT 41.8 10/08/2023     10/08/2023       Lipid Panel:  Lab Results   Component Value Date    CHOL 223 (H) 11/22/2022    HDL 53 11/22/2022    .00 (H) 11/22/2022    TRIG 68 11/22/2022    TOTALCHOLEST 4 11/22/2022        Urine:  Lab Results   Component Value Date    APPEARANCEUA Clear 09/06/2023    SGUA 1.007 09/06/2023    PROTEINUA Negative 09/06/2023    KETONESUA Negative 09/06/2023    LEUKOCYTESUR 75 (A) 09/06/2023    RBCUA 0-5 09/06/2023    WBCUA 6-10 (A) 09/06/2023    BACTERIA Moderate (A) 09/06/2023    SQEPUA Few (A) 09/06/2023    HYALINECASTS None Seen 09/06/2023        Assessment:          ICD-10-CM ICD-9-CM   1. Encounter for screening mammogram for breast cancer  Z12.31 V76.12   2. Encounter for immunization  Z23 V03.89   3. Neuropathy  G62.9 355.9   4. Hyperlipidemia, unspecified hyperlipidemia type  E78.5 272.4   5. Unprotected sex  Z72.51 V69.2   6. Wellness examination  Z00.00 V70.0   7. Anxiety  F41.9 300.00   8. Mood swings  R45.86 799.24   9. Pain of left lower extremity  M79.605 729.5   10. Other hyperlipidemia  E78.49 272.4   11. Closed fracture of tibial plateau, left, initial encounter  S82.142A 823.00   12. Postmenopausal  Z78.0 V49.81        Plan:     1. Encounter for screening mammogram for breast cancer  -     Mammo Digital Screening Bilat; Future; Expected date: 05/31/2024    2. Encounter for immunization  -     varicella-zoster GE-AS01B (PF)(SHINGRIX) 50 mcg/0.5 mL KIT    3. Neuropathy  -     gabapentin (NEURONTIN) 300 MG capsule; Take 1 capsule (300 mg total) by mouth every evening.  Dispense: 30 capsule; Refill: 6    4. Hyperlipidemia,  unspecified hyperlipidemia type  -     rosuvastatin (CRESTOR) 5 MG tablet; Take 1 tablet (5 mg total) by mouth once daily.  Dispense: 90 tablet; Refill: 1  -     Lipid Panel; Future; Expected date: 05/31/2024    5. Unprotected sex    6. Wellness examination  Assessment & Plan:  Labs and health maintenance as below    Orders:  -     CBC Auto Differential; Future; Expected date: 05/31/2024  -     Comprehensive Metabolic Panel; Future; Expected date: 05/31/2024  -     Lipid Panel; Future; Expected date: 05/31/2024  -     TSH; Future; Expected date: 05/31/2024  -     Hemoglobin A1C; Future; Expected date: 05/31/2024  -     Urinalysis; Future; Expected date: 05/31/2024  -     Estradiol; Future; Expected date: 05/31/2024  -     Ferritin; Future; Expected date: 05/31/2024  -     Luteinizing Hormone; Future; Expected date: 05/31/2024  -     Progesterone; Future; Expected date: 05/31/2024  -     CBC Auto Differential; Future; Expected date: 05/31/2024  -     Follicle Stimulating Hormone; Future; Expected date: 05/31/2024  -     T4, Free; Future; Expected date: 05/31/2024  -     Vitamin D; Future; Expected date: 05/31/2024  -     Hepatitis C Antibody; Future; Expected date: 05/31/2024  -     HIV 1/2 Ag/Ab (4th Gen); Future; Expected date: 05/31/2024  -     Chlamydia/GC, PCR  -     SYPHILIS ANTIBODY (WITH REFLEX RPR); Future; Expected date: 05/31/2024    7. Anxiety  Assessment & Plan:  Will check TSH and T4 today   Discussed with patient that her medications are currently being prescribed by the [facility] and that she should seek and notify them of her issues with anxiety   Patient does have upcoming appointment with them  Will defer treatment to IOP    Orders:  -     TSH; Future; Expected date: 05/31/2024  -     T4, Free; Future; Expected date: 05/31/2024    8. Mood swings  -     Estradiol; Future; Expected date: 05/31/2024  -     Luteinizing Hormone; Future; Expected date: 05/31/2024  -     Progesterone; Future; Expected date:  05/31/2024  -     Follicle Stimulating Hormone; Future; Expected date: 05/31/2024    9. Pain of left lower extremity  -     ibuprofen (ADVIL,MOTRIN) 800 MG tablet; Take 1 tablet (800 mg total) by mouth 3 (three) times daily.  Dispense: 90 tablet; Refill: 6    10. Other hyperlipidemia  Overview:  Shared decision making. Would like to start Crestor  5 mg started with medication AE discussion     Assessment & Plan:  Refilling Crestor 5 mg and checking cholesterol panel today      11. Closed fracture of tibial plateau, left, initial encounter  Assessment & Plan:  Discussed with patient that I do not provide chronic pain management however provided gabapentin 300 mg nightly as patient states that this does help the pain and discuss with patient that Toradol is not meant to be taken for more than 5 days but did give patient a prescription for ibuprofen 800 mg t.i.d.  Encourage patient to continue her stretching exercises as instructed by Orthopedics      12. Postmenopausal  Assessment & Plan:  Running female fatigue and hormonal panel labs   Discussed with patient that I do not provide hormonal therapy advised patient to contact the phone number in the back of her insurance card and call for providers who take her insurance   Patient also reports that she would self-pay for a visit for hormonal consideration   Gave patient information on doctor who does hormone replacement           Follow up in about 3 months (around 8/31/2024) for lab results.    Future Appointments   Date Time Provider Department Center   9/4/2024  1:00 PM Kayleigh Mcdaniel MD [facility]   1/9/2025 12:30 PM Fransisca Llanos, ANP [facility]        Kayleigh Mcdaniel MD

## 2024-05-31 NOTE — ASSESSMENT & PLAN NOTE
Will check TSH and T4 today   Discussed with patient that her medications are currently being prescribed by the IOP and that she should seek and notify them of her issues with anxiety   Patient does have upcoming appointment with them  Will defer treatment to IOP

## 2024-07-28 ENCOUNTER — HOSPITAL ENCOUNTER (EMERGENCY)
Facility: HOSPITAL | Age: 64
Discharge: HOME | End: 2024-07-28
Attending: STUDENT IN AN ORGANIZED HEALTH CARE EDUCATION/TRAINING PROGRAM
Payer: MEDICAID

## 2024-07-28 VITALS
WEIGHT: 136.88 LBS | SYSTOLIC BLOOD PRESSURE: 141 MMHG | RESPIRATION RATE: 16 BRPM | DIASTOLIC BLOOD PRESSURE: 86 MMHG | HEIGHT: 64 IN | HEART RATE: 87 BPM | BODY MASS INDEX: 23.37 KG/M2 | OXYGEN SATURATION: 100 % | TEMPERATURE: 98 F

## 2024-07-28 DIAGNOSIS — M79.672 LEFT FOOT PAIN: ICD-10-CM

## 2024-07-28 DIAGNOSIS — M25.562 LEFT KNEE PAIN: ICD-10-CM

## 2024-07-28 PROCEDURE — 96372 THER/PROPH/DIAG INJ SC/IM: CPT | Performed by: PHYSICIAN ASSISTANT

## 2024-07-28 PROCEDURE — 99284 EMERGENCY DEPT VISIT MOD MDM: CPT | Mod: 25

## 2024-07-28 PROCEDURE — 63600175 PHARM REV CODE 636 W HCPCS: Performed by: PHYSICIAN ASSISTANT

## 2024-07-28 RX ORDER — KETOROLAC TROMETHAMINE 30 MG/ML
30 INJECTION, SOLUTION INTRAMUSCULAR; INTRAVENOUS
Status: COMPLETED | OUTPATIENT
Start: 2024-07-28 | End: 2024-07-28

## 2024-07-28 RX ORDER — PREDNISONE 20 MG/1
20 TABLET ORAL DAILY
Qty: 4 TABLET | Refills: 0 | Status: SHIPPED | OUTPATIENT
Start: 2024-07-28 | End: 2024-08-01

## 2024-07-28 RX ADMIN — KETOROLAC TROMETHAMINE 30 MG: 30 INJECTION, SOLUTION INTRAMUSCULAR; INTRAVENOUS at 11:07

## 2024-08-05 ENCOUNTER — OFFICE VISIT (OUTPATIENT)
Dept: ORTHOPEDICS | Facility: CLINIC | Age: 64
End: 2024-08-05
Payer: MEDICAID

## 2024-08-05 VITALS
HEIGHT: 64 IN | BODY MASS INDEX: 23.04 KG/M2 | WEIGHT: 134.94 LBS | HEART RATE: 64 BPM | SYSTOLIC BLOOD PRESSURE: 102 MMHG | TEMPERATURE: 98 F | DIASTOLIC BLOOD PRESSURE: 66 MMHG

## 2024-08-05 DIAGNOSIS — S82.142D CLOSED FRACTURE OF LEFT TIBIAL PLATEAU WITH ROUTINE HEALING, SUBSEQUENT ENCOUNTER: Primary | ICD-10-CM

## 2024-08-05 DIAGNOSIS — G89.29 CHRONIC PAIN OF LEFT KNEE: ICD-10-CM

## 2024-08-05 DIAGNOSIS — M25.562 CHRONIC PAIN OF LEFT KNEE: ICD-10-CM

## 2024-08-05 PROCEDURE — 3074F SYST BP LT 130 MM HG: CPT | Mod: CPTII,,, | Performed by: ORTHOPAEDIC SURGERY

## 2024-08-05 PROCEDURE — 99499 UNLISTED E&M SERVICE: CPT | Mod: S$PBB,,, | Performed by: ORTHOPAEDIC SURGERY

## 2024-08-05 PROCEDURE — 99213 OFFICE O/P EST LOW 20 MIN: CPT | Mod: PBBFAC

## 2024-08-05 PROCEDURE — 3044F HG A1C LEVEL LT 7.0%: CPT | Mod: CPTII,,, | Performed by: ORTHOPAEDIC SURGERY

## 2024-08-05 PROCEDURE — 3078F DIAST BP <80 MM HG: CPT | Mod: CPTII,,, | Performed by: ORTHOPAEDIC SURGERY

## 2024-08-05 PROCEDURE — 1159F MED LIST DOCD IN RCRD: CPT | Mod: CPTII,,, | Performed by: ORTHOPAEDIC SURGERY

## 2024-08-05 PROCEDURE — 3008F BODY MASS INDEX DOCD: CPT | Mod: CPTII,,, | Performed by: ORTHOPAEDIC SURGERY

## 2024-08-05 RX ORDER — DICLOFENAC SODIUM 10 MG/G
2 GEL TOPICAL 4 TIMES DAILY
Qty: 150 G | Refills: 2 | Status: SHIPPED | OUTPATIENT
Start: 2024-08-05

## 2024-08-06 NOTE — PROGRESS NOTES
[facility]  Established Patient Office Visit  08/06/2024       Patient ID: Sasha Carrillo  YOB: 1960  MRN: [id number]    Chief Complaint: Foot Pain of the Left Foot (Referred for Knot Under Lt foot. Pain level 8 to Lt Knee.)      Orthopaedic Injuries:  1. Left Schatzker 2 tibial plateau fracture     Orthopaedic Surgeries:   1. ORIF left tibial plateau 10/19/2023    HPI:  Sasha Carrillo is a 64 y.o. female with above. She did okay postoperatively, with some reduction in range of motion in terms of terminal flexion.        Patient presents to clinic today for referral for not under left foot as well as follow up of left knee.  Patient reports noticing a a small bump underneath her left foot overlying the plantar fascia ever since her surgery 10 months ago.  Patient reports occasional tenderness but denies any tenderness or pain today.  Patient reports she was concerned that it may be a blood clot.  Denies any swelling or calf pain.  Reports but has slowly decreased in size over the past 10 months.   In regards to her left knee patient reports she has been doing okay but continues to have knee pain.  Knee range of motion has improved patient reports she still has weakness in knee with difficulty going up stairs.  Reports increased pain with increased use.  Takes ibuprofen for pain. Reports some tenderness over plate/ incision. Patient underwent postop therapy but has not been to therapy in over 4 months.  Denies any interval trauma.    12 point ROS performed and negative except as above.     Past Medical History:    Past Medical History:   Diagnosis Date    Anxiety     Fractures     rigth big toe    High cholesterol      Past Surgical History:   Procedure Laterality Date    OPEN REDUCTION AND INTERNAL FIXATION (ORIF) OF FRACTURE OF TIBIAL PLATEAU Left 10/19/2023    Procedure: ORIF, FRACTURE, TIBIA, PLATEAU;  Surgeon: Luis Garcia MD;  Location: [facility];  Service:  Orthopedics;  Laterality: Left;  C arm from opposite side, bone foam  Synthes    TONSILLECTOMY       Family History   Problem Relation Name Age of Onset    Cancer Mother      COPD Father       Social History     Socioeconomic History    Marital status: Single    Number of children: 3   Tobacco Use    Smoking status: Every Day     Current packs/day: 0.50     Types: Cigarettes     Passive exposure: Never    Smokeless tobacco: Never    Tobacco comments:     Quit 6 month ago   Substance and Sexual Activity    Alcohol use: Not Currently     Alcohol/week: 6.0 standard drinks of alcohol     Types: 6 Cans of beer per week    Drug use: Not Currently     Types: Marijuana     Comment: three days a week    Sexual activity: Not Currently     Birth control/protection: Abstinence     Medication List with Changes/Refills   New Medications    DICLOFENAC SODIUM (VOLTAREN ARTHRITIS PAIN) 1 % GEL    Apply 2 g topically 4 (four) times daily.   Current Medications    GABAPENTIN (NEURONTIN) 300 MG CAPSULE    Take 1 capsule (300 mg total) by mouth every evening.    IBUPROFEN (ADVIL,MOTRIN) 800 MG TABLET    Take 1 tablet (800 mg total) by mouth 3 (three) times daily.    LATUDA 40 MG TAB TABLET    Take 40 mg by mouth every evening.    MIRTAZAPINE (REMERON) 30 MG TABLET    Take 30 mg by mouth every evening.    ROSUVASTATIN (CRESTOR) 5 MG TABLET    Take 1 tablet (5 mg total) by mouth once daily.   Discontinued Medications    DICLOFENAC (VOLTAREN) 75 MG EC TABLET    Take 1 tablet (75 mg total) by mouth 2 (two) times daily as needed (Pain).     Review of patient's allergies indicates:  No Known Allergies    Physical Exam:  Left knee:   Surgical incision is well healed   Minimal hardware prominence  Mild tender to palpation over the plate/ about scar  Mild tender to palpation over medial/lateral joint line   Knee range of motion 0-120  Small sub 1 cm bump overlying plantar fascia, nontender to palpation, no tenderness about the Achilles  tendon  Pain-free full foot and ankle range of motion    Imaging independently interpreted:  X-ray left knee without evidence of hardware complication, some chronic degenerative changes of knee with medial and lateral joint space narrowing  X-ray left foot with no osseous abnormality    Assessment and Plan:    Sasha Carrillo is a 64 y.o. female with above.    -explained and provided reassurance to patient that knot under foot is likely due to plantar fascia scarring/fibrosis, discussed demonstrated stretching exercises in clinic today if patient has plantar fascia pain but denies any today, discussed option of physical therapy if patient begins to develop plantar fasciitis  -explained to patient knee pain is likely due to chronic arthritis as seen on plain films as well as deconditioning, recommended physical therapy and referral given today, prescription of Voltaren gel given  -follow up as needed,  if patient continues to have knee pain after therapy, can consider obtaining CT to assess for fracture healing and possible plate removal      Lalo Olivo MD  [facility] PGY3          Orders Placed This Encounter    diclofenac sodium (VOLTAREN ARTHRITIS PAIN) 1 % Gel

## 2024-09-02 PROBLEM — Z00.00 WELLNESS EXAMINATION: Status: RESOLVED | Noted: 2023-08-30 | Resolved: 2024-09-02

## (undated) DEVICE — GLOVE SENSICARE PI GRN 6.5

## (undated) DEVICE — SUT MONO PLUS 2-0 CP-1 27IN

## (undated) DEVICE — SYR 10CC LUER LOCK

## (undated) DEVICE — SUT 2/0 27IN PDS II VIO MO

## (undated) DEVICE — GLOVE SENSICARE PI GRN 8

## (undated) DEVICE — SPONGE LAP XRAY STERILE 18X18

## (undated) DEVICE — KIT SURGICAL TURNOVER

## (undated) DEVICE — Device

## (undated) DEVICE — SUT CTD VICRYL 0 UND BR CT

## (undated) DEVICE — COVER FULLGUARD SHOE HIGH-TOP

## (undated) DEVICE — COVER EQUIPMENT 36X25

## (undated) DEVICE — DRAPE STERI U-SHAPED 47X51IN

## (undated) DEVICE — BIT DRILL CALIBRATED 2.5X225MM

## (undated) DEVICE — SOL NACL IRR 1000ML BTL

## (undated) DEVICE — GLOVE PROTEXIS HYDROGEL SZ8.5

## (undated) DEVICE — DRAPE ORTH SPLIT 77X108IN

## (undated) DEVICE — BLADE SURG STAINLESS STEEL #15

## (undated) DEVICE — ELECTRODE BLADE INSULATED 1 IN

## (undated) DEVICE — PADDING CAST SPECIALIST 6X4YD

## (undated) DEVICE — COVER MAYO STAND REINFRCD 30

## (undated) DEVICE — TAPE SILK 3IN

## (undated) DEVICE — GLOVE SIGNATURE MICRO LTX 8

## (undated) DEVICE — GOWN POLY REINF X-LONG 2XL

## (undated) DEVICE — GOWN POLY REINF BRTH SLV XL

## (undated) DEVICE — GLOVE PROTEXIS BLUE LATEX 8.5

## (undated) DEVICE — BNDG NS SWIFT WRP ELAS 6INX5YD

## (undated) DEVICE — NDL HYPO REG 25G X 1 1/2

## (undated) DEVICE — DRESSING XEROFORM FOIL PK 1X8

## (undated) DEVICE — DRAPE C-ARMOR EQUIPMENT COVER

## (undated) DEVICE — GAUZE SPONGE 4X4 12PLY

## (undated) DEVICE — PAD ABD 8X10 STERILE

## (undated) DEVICE — BIT DRILL CALIB STOP 2.8X225

## (undated) DEVICE — GLOVE SENSICARE PI GRN 6

## (undated) DEVICE — BANDAGE ESMARK 6X12

## (undated) DEVICE — APPLICATOR CHLORAPREP ORN 26ML

## (undated) DEVICE — DRAPE EXTREMITY STD 89X128IN

## (undated) DEVICE — BIT DRILL 2.8

## (undated) DEVICE — PADDING WYTEX UNDRCST 6INX4YD

## (undated) DEVICE — NDL SURG MAYO CATGUT

## (undated) DEVICE — SUT PROLENE 3-0 SH DA 36 BL

## (undated) DEVICE — GLOVE SIGNATURE MICRO LTX 6.5